# Patient Record
Sex: MALE | Race: WHITE | NOT HISPANIC OR LATINO | Employment: OTHER | URBAN - NONMETROPOLITAN AREA
[De-identification: names, ages, dates, MRNs, and addresses within clinical notes are randomized per-mention and may not be internally consistent; named-entity substitution may affect disease eponyms.]

---

## 2017-06-30 ENCOUNTER — HOSPITAL ENCOUNTER (EMERGENCY)
Facility: HOSPITAL | Age: 62
Discharge: HOME | End: 2017-07-01
Attending: EMERGENCY MEDICINE | Admitting: EMERGENCY MEDICINE

## 2017-06-30 VITALS
BODY MASS INDEX: 22.9 KG/M2 | HEART RATE: 74 BPM | DIASTOLIC BLOOD PRESSURE: 83 MMHG | WEIGHT: 160 LBS | OXYGEN SATURATION: 96 % | HEIGHT: 70 IN | RESPIRATION RATE: 18 BRPM | SYSTOLIC BLOOD PRESSURE: 151 MMHG | TEMPERATURE: 98.7 F

## 2017-06-30 DIAGNOSIS — B95.62 MRSA CELLULITIS: ICD-10-CM

## 2017-06-30 DIAGNOSIS — L03.119 CELLULITIS AND ABSCESS OF LEG: Primary | ICD-10-CM

## 2017-06-30 DIAGNOSIS — L03.90 MRSA CELLULITIS: ICD-10-CM

## 2017-06-30 DIAGNOSIS — L02.419 CELLULITIS AND ABSCESS OF LEG: Primary | ICD-10-CM

## 2017-06-30 PROCEDURE — 99283 EMERGENCY DEPT VISIT LOW MDM: CPT

## 2017-06-30 RX ORDER — SULFAMETHOXAZOLE AND TRIMETHOPRIM 800; 160 MG/1; MG/1
1 TABLET ORAL 2 TIMES DAILY
Qty: 20 TABLET | Refills: 0 | Status: SHIPPED | OUTPATIENT
Start: 2017-06-30 | End: 2018-01-09

## 2017-06-30 RX ORDER — SULFAMETHOXAZOLE AND TRIMETHOPRIM 800; 160 MG/1; MG/1
1 TABLET ORAL ONCE
Status: COMPLETED | OUTPATIENT
Start: 2017-06-30 | End: 2017-07-01

## 2017-07-01 RX ADMIN — SULFAMETHOXAZOLE AND TRIMETHOPRIM 160 MG: 800; 160 TABLET ORAL at 00:04

## 2017-07-01 NOTE — ED PROVIDER NOTES
Subjective   Patient is a 62 y.o. male presenting with lower extremity pain.   History provided by:  Patient  Lower Extremity Issue   Location:  Leg  Time since incident:  2 days  Injury: no    Leg location:  L leg  Pain details:     Quality:  Aching    Radiates to:  Does not radiate    Severity:  Mild    Onset quality:  Gradual    Duration:  2 days    Timing:  Constant    Progression:  Worsening  Chronicity:  New  Dislocation: no    Foreign body present:  No foreign bodies  Prior injury to area:  No  Relieved by:  Nothing  Worsened by:  Bearing weight  Associated symptoms: swelling    Associated symptoms: no back pain, no decreased ROM, no fatigue and no fever        Review of Systems   Constitutional: Negative for chills, diaphoresis, fatigue and fever.   HENT: Negative for rhinorrhea and sore throat.    Eyes: Negative for pain, discharge and visual disturbance.   Respiratory: Negative for cough, chest tightness, shortness of breath and wheezing.    Cardiovascular: Negative for chest pain and leg swelling.   Gastrointestinal: Negative for abdominal pain, blood in stool, constipation, diarrhea and vomiting.   Endocrine: Negative for polydipsia and polyuria.   Genitourinary: Negative for decreased urine volume, dysuria, flank pain, frequency and hematuria.   Musculoskeletal: Negative for back pain and myalgias.   Skin: Negative for rash.   Neurological: Negative for dizziness, syncope, speech difficulty, weakness, light-headedness and headaches.   Psychiatric/Behavioral: Negative for confusion and decreased concentration.   All other systems reviewed and are negative.      No past medical history on file.    Allergies   Allergen Reactions   • Contrast Dye Hives   • Keflex [Cephalexin] Hives       No past surgical history on file.    No family history on file.    Social History     Social History   • Marital status:      Spouse name: N/A   • Number of children: N/A   • Years of education: N/A     Social  History Main Topics   • Smoking status: Not on file   • Smokeless tobacco: Not on file   • Alcohol use Not on file   • Drug use: Not on file   • Sexual activity: Not on file     Other Topics Concern   • Not on file     Social History Narrative           Objective   Physical Exam   Constitutional: He appears well-developed and well-nourished.  Non-toxic appearance.   HENT:   Head: Normocephalic and atraumatic.   Nose: Nose normal.   Mouth/Throat: Oropharynx is clear and moist.   Eyes: Conjunctivae, EOM and lids are normal.   Neck: Neck supple. No tracheal deviation present.   Cardiovascular: Normal rate, regular rhythm, normal heart sounds and intact distal pulses.    No murmur heard.  Pulmonary/Chest: Effort normal and breath sounds normal. No stridor. No tachypnea. No respiratory distress. He has no wheezes. He has no rales.   Abdominal: Soft. Bowel sounds are normal. He exhibits no distension and no mass. There is no tenderness. There is no rebound and no guarding.   Musculoskeletal: He exhibits no edema.   Neurological: He is alert. No cranial nerve deficit. He exhibits normal muscle tone. Coordination normal.   Skin: Skin is warm and dry. No rash noted. There is erythema. No pallor.   L calf with area erythema 3 cm diameter with purple/black center, no fluctuance, no streks.   Psychiatric: He has a normal mood and affect. His behavior is normal. Judgment and thought content normal.   Nursing note and vitals reviewed.      Procedures         ED Course  ED Course                  MDM    Final diagnoses:   Cellulitis and abscess of leg            Ger Miranda MD  07/28/17 0905       Ger Miranda MD  07/28/17 0916

## 2017-07-01 NOTE — ED NOTES
Pt. Presents to the ED with complaints of a spider bite to the left calf that he noticed on Tuesday but states black Jackson appeared yesterday.       Roxanna Herrera RN  06/30/17 [id number]

## 2018-04-10 ENCOUNTER — OFFICE VISIT (OUTPATIENT)
Dept: FAMILY MEDICINE CLINIC | Facility: CLINIC | Age: 63
End: 2018-04-10

## 2018-04-10 VITALS
DIASTOLIC BLOOD PRESSURE: 80 MMHG | SYSTOLIC BLOOD PRESSURE: 120 MMHG | BODY MASS INDEX: 21.47 KG/M2 | HEART RATE: 70 BPM | WEIGHT: 150 LBS | OXYGEN SATURATION: 99 % | HEIGHT: 70 IN

## 2018-04-10 DIAGNOSIS — M54.50 CHRONIC LOW BACK PAIN WITHOUT SCIATICA, UNSPECIFIED BACK PAIN LATERALITY: ICD-10-CM

## 2018-04-10 DIAGNOSIS — G89.29 CHRONIC LOW BACK PAIN WITHOUT SCIATICA, UNSPECIFIED BACK PAIN LATERALITY: ICD-10-CM

## 2018-04-10 DIAGNOSIS — Z23 NEED FOR TETANUS BOOSTER: ICD-10-CM

## 2018-04-10 DIAGNOSIS — Z23 NEED FOR VACCINATION FOR STREP PNEUMONIAE: ICD-10-CM

## 2018-04-10 DIAGNOSIS — F17.200 TOBACCO DEPENDENCE SYNDROME: ICD-10-CM

## 2018-04-10 DIAGNOSIS — Z12.11 COLON CANCER SCREENING: Primary | ICD-10-CM

## 2018-04-10 PROCEDURE — 90472 IMMUNIZATION ADMIN EACH ADD: CPT | Performed by: FAMILY MEDICINE

## 2018-04-10 PROCEDURE — 90732 PPSV23 VACC 2 YRS+ SUBQ/IM: CPT | Performed by: FAMILY MEDICINE

## 2018-04-10 PROCEDURE — 99406 BEHAV CHNG SMOKING 3-10 MIN: CPT | Performed by: FAMILY MEDICINE

## 2018-04-10 PROCEDURE — 90715 TDAP VACCINE 7 YRS/> IM: CPT | Performed by: FAMILY MEDICINE

## 2018-04-10 PROCEDURE — 99203 OFFICE O/P NEW LOW 30 MIN: CPT | Performed by: FAMILY MEDICINE

## 2018-04-10 PROCEDURE — G0009 ADMIN PNEUMOCOCCAL VACCINE: HCPCS | Performed by: FAMILY MEDICINE

## 2018-04-10 RX ORDER — OXYCODONE HYDROCHLORIDE AND ACETAMINOPHEN 5; 325 MG/1; MG/1
1 TABLET ORAL DAILY
COMMUNITY
End: 2021-01-01

## 2018-04-19 PROBLEM — F17.200 TOBACCO DEPENDENCE SYNDROME: Status: ACTIVE | Noted: 2018-04-19

## 2018-04-19 PROBLEM — G89.29 CHRONIC LOWER BACK PAIN: Status: ACTIVE | Noted: 2018-04-19

## 2018-04-19 PROBLEM — M54.50 CHRONIC LOWER BACK PAIN: Status: ACTIVE | Noted: 2018-04-19

## 2018-04-19 NOTE — PROGRESS NOTES
Subjective:     Chief Complaint:   Chief Complaint   Patient presents with   • Establish Care   • Back Pain     chronic , x 3 back surgeries        Leopoldo La is a 62 y.o. male who presents for establish care visit. Patient states that he is coming for preventative services that may be needed. He has a history of prior back surgeries x 3. He states that he sees a pain clinic to get his medications. He states that he has never had a colonoscopy. He states that he is unsure about getting one, but is agreeable to getting a fecal immunoassay for colon cancer screening. He is also due for tetanus and pneumovax today. He states that he has been complaining of fatigue, and in the past, has been getting testosterone injections. He has not been getting these in some time. He states that he does see Dr. Bedolla, and was going to ask him if he can provide this service. Patient is a current smoker. He states that smokes at least a half-pack or less of cigarettes a day. Patient states that at this time, he is not interested in quitting smoking. No other acute issues to discuss today.     Past Medical Hx:  No past medical history on file.    Past Surgical Hx:  No past surgical history on file.    Health Maintenance:  Health Maintenance   Topic Date Due   • HEPATITIS C SCREENING  06/30/2017   • MEDICARE ANNUAL WELLNESS  06/30/2017   • COLONOSCOPY  06/30/2017   • ZOSTER VACCINE  06/30/2017   • INFLUENZA VACCINE  08/01/2018   • TDAP/TD VACCINES (2 - Td) 04/10/2028   • PNEUMOCOCCAL VACCINE (19-64 MEDIUM RISK)  Completed       Current Meds:    Current Outpatient Prescriptions:   •  ibuprofen (ADVIL,MOTRIN) 800 MG tablet, Take 800 mg by mouth Every 8 (Eight) Hours As Needed for Mild Pain ., Disp: , Rfl:   •  methadone (DOLOPHINE) 10 MG tablet, 10 mg 3 (Three) Times a Day , , Disp: , Rfl:   •  methocarbamol (ROBAXIN) 750 MG tablet, Take 750 mg by mouth 3 (Three) Times a Day., Disp: , Rfl:   •  oxyCODONE-acetaminophen (PERCOCET)  "5-325 MG per tablet, Take 1 tablet by mouth Daily., Disp: , Rfl:     Allergies:  Contrast dye and Keflex [cephalexin]    Family Hx:  No family history on file.     Social History:  Social History     Social History   • Marital status:      Spouse name: N/A   • Number of children: N/A   • Years of education: N/A     Occupational History   • Not on file.     Social History Main Topics   • Smoking status: Current Every Day Smoker     Packs/day: 0.25     Years: 20.00     Types: Cigarettes   • Smokeless tobacco: Never Used   • Alcohol use Not on file   • Drug use: Unknown   • Sexual activity: Not on file     Other Topics Concern   • Not on file     Social History Narrative   • No narrative on file       Review of Systems  Review of Systems   Constitutional: Negative for appetite change, chills and fever.   HENT: Negative for congestion, ear pain, rhinorrhea, sneezing and sore throat.    Respiratory: Negative for cough and shortness of breath.    Cardiovascular: Negative for chest pain, palpitations and leg swelling.   Gastrointestinal: Negative for diarrhea, nausea and vomiting.   Endocrine: Negative for polyphagia and polyuria.   Musculoskeletal: Positive for back pain. Negative for neck pain.   Skin: Negative for color change and rash.   Neurological: Negative for dizziness, syncope and weakness.   Psychiatric/Behavioral: Negative for agitation, confusion and dysphoric mood.       Objective:     /80 (BP Location: Left arm, Cuff Size: Adult)   Pulse 70   Ht 177.8 cm (70\")   Wt 68 kg (150 lb)   SpO2 99%   BMI 21.52 kg/m²     Physical Exam   Constitutional: He is oriented to person, place, and time. He appears well-developed and well-nourished.   Cardiovascular: Normal rate, regular rhythm and normal heart sounds.  Exam reveals no gallop and no friction rub.    No murmur heard.  Pulmonary/Chest: Effort normal and breath sounds normal. No respiratory distress. He has no wheezes. He has no rales. "   Abdominal: Soft. Bowel sounds are normal. There is no tenderness.   Musculoskeletal: Normal range of motion. He exhibits no edema.   Neurological: He is alert and oriented to person, place, and time.   Skin: Skin is warm and dry.   Psychiatric: He has a normal mood and affect. His behavior is normal.   Vitals reviewed.         Assessment/Plan:     Leopoldo was seen today for establish care and back pain.    Diagnoses and all orders for this visit:    Colon cancer screening  -     POC FECAL OCCULT BLOOD BY IMMUNOASSAY    Need for tetanus booster  -     Tdap (BOOSTRIX) 5-2.5-18.5 LF-MCG/0.5 injection; Inject 0.5 mL into the shoulder, thigh, or buttocks 1 (One) Time for 1 dose.    Need for vaccination for Strep pneumoniae  -     Pneumococcal Polysaccharide Vaccine 23-Valent Greater Than or Equal To 1yo Subcutaneous / IM    Chronic low back pain without sciatica, unspecified back pain laterality    Tobacco dependence syndrome    Other orders  -     Tdap Vaccine Greater Than or Equal To 6yo IM        Return in about 1 year (around 4/10/2019) for Annual.    GOALS:  Improve fatigue, improve diet and exercise   BARRIERS TO GOALS:  Back pain may limit functionality and ability to exercise.      Preventative:  Male Preventative: Exercises regularly  Cholesterol screening up to date  up to date and documented   Recommended:DTaP, Influenza and Pneumovax  Tobacco: Smoking cessation counseling was provided. A total of 3 minutes was spent on smoking cessation counseling.   Alcohol: does not drink  Diet/Exercise: eat more fruits and vegetables, decrease soda or juice intake, increase water intake, increase physical activity, reduce screen time, reduce portion size, cut out extra servings, reduce fast food intake, family to eat at dinner table more often, keep TV off during meals, plan meals, eat breakfast and have 3 meals a day    RISK SCORE: 3      This document has been electronically signed by Keshawn Aleman MD on April 19,  2018 12:02 PM

## 2021-01-01 ENCOUNTER — INFUSION (OUTPATIENT)
Dept: ONCOLOGY | Facility: HOSPITAL | Age: 66
End: 2021-01-01

## 2021-01-01 ENCOUNTER — APPOINTMENT (OUTPATIENT)
Dept: ONCOLOGY | Facility: HOSPITAL | Age: 66
End: 2021-01-01

## 2021-01-01 ENCOUNTER — APPOINTMENT (OUTPATIENT)
Dept: GENERAL RADIOLOGY | Facility: HOSPITAL | Age: 66
End: 2021-01-01

## 2021-01-01 ENCOUNTER — LAB (OUTPATIENT)
Dept: LAB | Facility: HOSPITAL | Age: 66
End: 2021-01-01

## 2021-01-01 ENCOUNTER — TELEPHONE (OUTPATIENT)
Dept: CARDIAC SURGERY | Facility: CLINIC | Age: 66
End: 2021-01-01

## 2021-01-01 ENCOUNTER — APPOINTMENT (OUTPATIENT)
Dept: ONCOLOGY | Facility: CLINIC | Age: 66
End: 2021-01-01

## 2021-01-01 ENCOUNTER — OFFICE VISIT (OUTPATIENT)
Dept: ONCOLOGY | Facility: CLINIC | Age: 66
End: 2021-01-01

## 2021-01-01 ENCOUNTER — OFFICE VISIT (OUTPATIENT)
Dept: CARDIAC SURGERY | Facility: CLINIC | Age: 66
End: 2021-01-01

## 2021-01-01 ENCOUNTER — NURSE NAVIGATOR (OUTPATIENT)
Dept: ONCOLOGY | Facility: CLINIC | Age: 66
End: 2021-01-01

## 2021-01-01 ENCOUNTER — LAB (OUTPATIENT)
Dept: ONCOLOGY | Facility: HOSPITAL | Age: 66
End: 2021-01-01

## 2021-01-01 ENCOUNTER — HOSPITAL ENCOUNTER (OUTPATIENT)
Dept: PET IMAGING | Facility: HOSPITAL | Age: 66
Discharge: HOME | End: 2021-04-16

## 2021-01-01 ENCOUNTER — TELEPHONE (OUTPATIENT)
Dept: ONCOLOGY | Facility: CLINIC | Age: 66
End: 2021-01-01

## 2021-01-01 ENCOUNTER — APPOINTMENT (OUTPATIENT)
Dept: CT IMAGING | Facility: HOSPITAL | Age: 66
End: 2021-01-01

## 2021-01-01 ENCOUNTER — HOSPITAL ENCOUNTER (OUTPATIENT)
Dept: CT IMAGING | Facility: HOSPITAL | Age: 66
Discharge: HOME | End: 2021-02-10
Admitting: RADIOLOGY

## 2021-01-01 ENCOUNTER — TELEPHONE (OUTPATIENT)
Dept: ONCOLOGY | Facility: HOSPITAL | Age: 66
End: 2021-01-01

## 2021-01-01 ENCOUNTER — HOSPITAL ENCOUNTER (OUTPATIENT)
Dept: MRI IMAGING | Facility: HOSPITAL | Age: 66
Discharge: HOME | End: 2021-04-16

## 2021-01-01 ENCOUNTER — TRANSCRIBE ORDERS (OUTPATIENT)
Dept: LAB | Facility: HOSPITAL | Age: 66
End: 2021-01-01

## 2021-01-01 ENCOUNTER — HOSPITAL ENCOUNTER (OUTPATIENT)
Facility: HOSPITAL | Age: 66
Setting detail: HOSPITAL OUTPATIENT SURGERY
End: 2021-01-01
Attending: SURGERY | Admitting: SURGERY

## 2021-01-01 ENCOUNTER — HOSPITAL ENCOUNTER (OUTPATIENT)
Dept: MRI IMAGING | Facility: HOSPITAL | Age: 66
Discharge: HOME | End: 2021-02-02
Admitting: INTERNAL MEDICINE

## 2021-01-01 ENCOUNTER — TELEPHONE (OUTPATIENT)
Dept: GENERAL RADIOLOGY | Facility: HOSPITAL | Age: 66
End: 2021-01-01

## 2021-01-01 ENCOUNTER — DOCUMENTATION (OUTPATIENT)
Dept: ONCOLOGY | Facility: CLINIC | Age: 66
End: 2021-01-01

## 2021-01-01 ENCOUNTER — PREP FOR SURGERY (OUTPATIENT)
Dept: OTHER | Facility: HOSPITAL | Age: 66
End: 2021-01-01

## 2021-01-01 ENCOUNTER — TRANSCRIBE ORDERS (OUTPATIENT)
Dept: CT IMAGING | Facility: HOSPITAL | Age: 66
End: 2021-01-01

## 2021-01-01 ENCOUNTER — DOCUMENTATION (OUTPATIENT)
Dept: NUTRITION | Facility: HOSPITAL | Age: 66
End: 2021-01-01

## 2021-01-01 ENCOUNTER — TELEPHONE (OUTPATIENT)
Dept: NUTRITION | Facility: HOSPITAL | Age: 66
End: 2021-01-01

## 2021-01-01 ENCOUNTER — TELEPHONE (OUTPATIENT)
Dept: RADIATION ONCOLOGY | Facility: HOSPITAL | Age: 66
End: 2021-01-01

## 2021-01-01 ENCOUNTER — HOSPITAL ENCOUNTER (OUTPATIENT)
Dept: CT IMAGING | Facility: HOSPITAL | Age: 66
Discharge: HOME | End: 2021-01-22

## 2021-01-01 ENCOUNTER — HOSPITAL ENCOUNTER (INPATIENT)
Facility: HOSPITAL | Age: 66
LOS: 1 days | End: 2021-04-25
Attending: EMERGENCY MEDICINE | Admitting: STUDENT IN AN ORGANIZED HEALTH CARE EDUCATION/TRAINING PROGRAM

## 2021-01-01 ENCOUNTER — HOSPITAL ENCOUNTER (EMERGENCY)
Facility: HOSPITAL | Age: 66
Discharge: HOME | End: 2021-02-12
Attending: EMERGENCY MEDICINE | Admitting: EMERGENCY MEDICINE

## 2021-01-01 ENCOUNTER — HOSPITAL ENCOUNTER (OUTPATIENT)
Dept: PET IMAGING | Facility: HOSPITAL | Age: 66
Discharge: HOME | End: 2021-01-29
Admitting: NURSE PRACTITIONER

## 2021-01-01 ENCOUNTER — APPOINTMENT (OUTPATIENT)
Dept: NUCLEAR MEDICINE | Facility: HOSPITAL | Age: 66
End: 2021-01-01

## 2021-01-01 ENCOUNTER — OFFICE VISIT (OUTPATIENT)
Dept: GASTROENTEROLOGY | Facility: CLINIC | Age: 66
End: 2021-01-01

## 2021-01-01 ENCOUNTER — HOSPITAL ENCOUNTER (OUTPATIENT)
Dept: GENERAL RADIOLOGY | Facility: HOSPITAL | Age: 66
Discharge: HOME | End: 2021-03-01

## 2021-01-01 ENCOUNTER — HOSPITAL ENCOUNTER (EMERGENCY)
Facility: HOSPITAL | Age: 66
Discharge: HOME | End: 2021-04-10
Attending: FAMILY MEDICINE | Admitting: FAMILY MEDICINE

## 2021-01-01 ENCOUNTER — CONSULT (OUTPATIENT)
Dept: ONCOLOGY | Facility: CLINIC | Age: 66
End: 2021-01-01

## 2021-01-01 ENCOUNTER — APPOINTMENT (OUTPATIENT)
Dept: MRI IMAGING | Facility: HOSPITAL | Age: 66
End: 2021-01-01

## 2021-01-01 ENCOUNTER — PRE-ADMISSION TESTING (OUTPATIENT)
Dept: PREADMISSION TESTING | Facility: HOSPITAL | Age: 66
End: 2021-01-01

## 2021-01-01 ENCOUNTER — HOSPITAL ENCOUNTER (OUTPATIENT)
Dept: ULTRASOUND IMAGING | Facility: HOSPITAL | Age: 66
Discharge: HOME | End: 2021-03-01

## 2021-01-01 ENCOUNTER — APPOINTMENT (OUTPATIENT)
Dept: PET IMAGING | Facility: HOSPITAL | Age: 66
End: 2021-01-01

## 2021-01-01 ENCOUNTER — HOSPITAL ENCOUNTER (OUTPATIENT)
Dept: INTERVENTIONAL RADIOLOGY/VASCULAR | Facility: HOSPITAL | Age: 66
Discharge: HOME | End: 2021-03-01

## 2021-01-01 ENCOUNTER — TRANSCRIBE ORDERS (OUTPATIENT)
Dept: GENERAL RADIOLOGY | Facility: HOSPITAL | Age: 66
End: 2021-01-01

## 2021-01-01 ENCOUNTER — HOSPITAL ENCOUNTER (OUTPATIENT)
Facility: HOSPITAL | Age: 66
Setting detail: HOSPITAL OUTPATIENT SURGERY
End: 2021-01-01
Attending: INTERNAL MEDICINE | Admitting: INTERNAL MEDICINE

## 2021-01-01 ENCOUNTER — OFFICE VISIT (OUTPATIENT)
Dept: SURGERY | Facility: CLINIC | Age: 66
End: 2021-01-01

## 2021-01-01 ENCOUNTER — NURSE TRIAGE (OUTPATIENT)
Dept: CALL CENTER | Facility: HOSPITAL | Age: 66
End: 2021-01-01

## 2021-01-01 VITALS
DIASTOLIC BLOOD PRESSURE: 64 MMHG | HEIGHT: 70 IN | SYSTOLIC BLOOD PRESSURE: 100 MMHG | BODY MASS INDEX: 19.9 KG/M2 | WEIGHT: 139 LBS | OXYGEN SATURATION: 96 % | HEART RATE: 103 BPM | RESPIRATION RATE: 18 BRPM

## 2021-01-01 VITALS
WEIGHT: 137 LBS | SYSTOLIC BLOOD PRESSURE: 118 MMHG | OXYGEN SATURATION: 98 % | DIASTOLIC BLOOD PRESSURE: 61 MMHG | HEIGHT: 70 IN | RESPIRATION RATE: 20 BRPM | TEMPERATURE: 99 F | BODY MASS INDEX: 19.61 KG/M2 | HEART RATE: 75 BPM

## 2021-01-01 VITALS
WEIGHT: 144 LBS | HEART RATE: 96 BPM | DIASTOLIC BLOOD PRESSURE: 90 MMHG | SYSTOLIC BLOOD PRESSURE: 150 MMHG | HEIGHT: 70 IN | OXYGEN SATURATION: 98 % | TEMPERATURE: 98.1 F | BODY MASS INDEX: 20.62 KG/M2

## 2021-01-01 VITALS
OXYGEN SATURATION: 100 % | TEMPERATURE: 97.6 F | HEIGHT: 70 IN | HEART RATE: 71 BPM | BODY MASS INDEX: 19.47 KG/M2 | DIASTOLIC BLOOD PRESSURE: 78 MMHG | WEIGHT: 136 LBS | RESPIRATION RATE: 18 BRPM | SYSTOLIC BLOOD PRESSURE: 134 MMHG

## 2021-01-01 VITALS
BODY MASS INDEX: 19.9 KG/M2 | TEMPERATURE: 98.7 F | HEIGHT: 70 IN | DIASTOLIC BLOOD PRESSURE: 70 MMHG | HEART RATE: 92 BPM | SYSTOLIC BLOOD PRESSURE: 112 MMHG | WEIGHT: 139 LBS

## 2021-01-01 VITALS
BODY MASS INDEX: 21 KG/M2 | SYSTOLIC BLOOD PRESSURE: 145 MMHG | HEIGHT: 69 IN | DIASTOLIC BLOOD PRESSURE: 93 MMHG | WEIGHT: 141.8 LBS | HEART RATE: 89 BPM

## 2021-01-01 VITALS
WEIGHT: 139.1 LBS | SYSTOLIC BLOOD PRESSURE: 126 MMHG | BODY MASS INDEX: 19.91 KG/M2 | HEIGHT: 70 IN | TEMPERATURE: 98.4 F | DIASTOLIC BLOOD PRESSURE: 61 MMHG | HEART RATE: 92 BPM | RESPIRATION RATE: 18 BRPM

## 2021-01-01 VITALS
RESPIRATION RATE: 16 BRPM | SYSTOLIC BLOOD PRESSURE: 133 MMHG | TEMPERATURE: 97.8 F | DIASTOLIC BLOOD PRESSURE: 75 MMHG | HEART RATE: 83 BPM

## 2021-01-01 VITALS
DIASTOLIC BLOOD PRESSURE: 72 MMHG | SYSTOLIC BLOOD PRESSURE: 132 MMHG | TEMPERATURE: 97.7 F | RESPIRATION RATE: 18 BRPM | HEART RATE: 52 BPM

## 2021-01-01 VITALS
HEIGHT: 70 IN | DIASTOLIC BLOOD PRESSURE: 75 MMHG | TEMPERATURE: 98 F | SYSTOLIC BLOOD PRESSURE: 135 MMHG | HEART RATE: 92 BPM | WEIGHT: 136.8 LBS | BODY MASS INDEX: 19.58 KG/M2 | RESPIRATION RATE: 18 BRPM

## 2021-01-01 VITALS
BODY MASS INDEX: 19.1 KG/M2 | HEART RATE: 108 BPM | RESPIRATION RATE: 18 BRPM | TEMPERATURE: 98.5 F | HEIGHT: 70 IN | DIASTOLIC BLOOD PRESSURE: 60 MMHG | WEIGHT: 133.4 LBS | SYSTOLIC BLOOD PRESSURE: 87 MMHG

## 2021-01-01 VITALS
WEIGHT: 137 LBS | BODY MASS INDEX: 19.61 KG/M2 | TEMPERATURE: 98.5 F | HEIGHT: 70 IN | RESPIRATION RATE: 18 BRPM | DIASTOLIC BLOOD PRESSURE: 75 MMHG | HEART RATE: 80 BPM | SYSTOLIC BLOOD PRESSURE: 131 MMHG

## 2021-01-01 VITALS
SYSTOLIC BLOOD PRESSURE: 141 MMHG | TEMPERATURE: 97 F | HEART RATE: 70 BPM | BODY MASS INDEX: 21.62 KG/M2 | HEIGHT: 69 IN | WEIGHT: 146 LBS | DIASTOLIC BLOOD PRESSURE: 80 MMHG | RESPIRATION RATE: 18 BRPM

## 2021-01-01 VITALS
HEIGHT: 69 IN | HEART RATE: 75 BPM | SYSTOLIC BLOOD PRESSURE: 146 MMHG | BODY MASS INDEX: 21.68 KG/M2 | WEIGHT: 146.4 LBS | DIASTOLIC BLOOD PRESSURE: 91 MMHG

## 2021-01-01 VITALS
HEART RATE: 143 BPM | SYSTOLIC BLOOD PRESSURE: 86 MMHG | DIASTOLIC BLOOD PRESSURE: 53 MMHG | OXYGEN SATURATION: 97 % | RESPIRATION RATE: 57 BRPM

## 2021-01-01 VITALS
WEIGHT: 136.9 LBS | HEART RATE: 97 BPM | OXYGEN SATURATION: 97 % | SYSTOLIC BLOOD PRESSURE: 125 MMHG | BODY MASS INDEX: 19.6 KG/M2 | TEMPERATURE: 98.7 F | DIASTOLIC BLOOD PRESSURE: 74 MMHG | HEIGHT: 70 IN | RESPIRATION RATE: 18 BRPM

## 2021-01-01 VITALS
SYSTOLIC BLOOD PRESSURE: 148 MMHG | TEMPERATURE: 97.6 F | HEART RATE: 102 BPM | RESPIRATION RATE: 18 BRPM | DIASTOLIC BLOOD PRESSURE: 76 MMHG

## 2021-01-01 VITALS
DIASTOLIC BLOOD PRESSURE: 89 MMHG | SYSTOLIC BLOOD PRESSURE: 122 MMHG | RESPIRATION RATE: 18 BRPM | TEMPERATURE: 97.6 F | HEART RATE: 85 BPM

## 2021-01-01 VITALS — WEIGHT: 141 LBS | HEIGHT: 70 IN | BODY MASS INDEX: 20.19 KG/M2

## 2021-01-01 VITALS
RESPIRATION RATE: 18 BRPM | TEMPERATURE: 97.2 F | HEART RATE: 90 BPM | SYSTOLIC BLOOD PRESSURE: 106 MMHG | DIASTOLIC BLOOD PRESSURE: 59 MMHG

## 2021-01-01 VITALS
HEIGHT: 70 IN | DIASTOLIC BLOOD PRESSURE: 80 MMHG | RESPIRATION RATE: 18 BRPM | WEIGHT: 139.99 LBS | BODY MASS INDEX: 20.04 KG/M2 | SYSTOLIC BLOOD PRESSURE: 135 MMHG | TEMPERATURE: 97.8 F | OXYGEN SATURATION: 95 % | HEART RATE: 83 BPM

## 2021-01-01 DIAGNOSIS — K83.1 OBSTRUCTIVE JAUNDICE: Primary | ICD-10-CM

## 2021-01-01 DIAGNOSIS — C34.90 SMALL CELL LUNG CANCER (HCC): ICD-10-CM

## 2021-01-01 DIAGNOSIS — C34.90 MALIGNANT NEOPLASM OF LUNG, UNSPECIFIED LATERALITY, UNSPECIFIED PART OF LUNG (HCC): ICD-10-CM

## 2021-01-01 DIAGNOSIS — G89.3 CANCER ASSOCIATED PAIN: ICD-10-CM

## 2021-01-01 DIAGNOSIS — R91.8 LUNG MASS: ICD-10-CM

## 2021-01-01 DIAGNOSIS — K92.2 UGIB (UPPER GASTROINTESTINAL BLEED): Primary | ICD-10-CM

## 2021-01-01 DIAGNOSIS — C34.90 SMALL CELL LUNG CANCER (HCC): Primary | ICD-10-CM

## 2021-01-01 DIAGNOSIS — R91.1 NODULE OF LEFT LUNG: Primary | ICD-10-CM

## 2021-01-01 DIAGNOSIS — Z51.5 HOSPICE CARE: Primary | ICD-10-CM

## 2021-01-01 DIAGNOSIS — Z45.2 ENCOUNTER FOR VENOUS ACCESS DEVICE CARE: ICD-10-CM

## 2021-01-01 DIAGNOSIS — Z01.818 PRE-OP TESTING: Primary | ICD-10-CM

## 2021-01-01 DIAGNOSIS — K73.9 CHRONIC HEPATITIS, UNSPECIFIED (HCC): ICD-10-CM

## 2021-01-01 DIAGNOSIS — K52.1 CHEMOTHERAPY INDUCED DIARRHEA: ICD-10-CM

## 2021-01-01 DIAGNOSIS — J43.1 PANLOBULAR EMPHYSEMA (HCC): ICD-10-CM

## 2021-01-01 DIAGNOSIS — K83.1 OBSTRUCTIVE JAUNDICE: ICD-10-CM

## 2021-01-01 DIAGNOSIS — K83.8 DILATED BILE DUCT: ICD-10-CM

## 2021-01-01 DIAGNOSIS — C41.9 MALIGNANT NEOPLASM OF BONE AND ARTICULAR CARTILAGE, UNSPECIFIED (HCC): ICD-10-CM

## 2021-01-01 DIAGNOSIS — R10.12 LEFT UPPER QUADRANT ABDOMINAL PAIN: Primary | ICD-10-CM

## 2021-01-01 DIAGNOSIS — T45.1X5A CHEMOTHERAPY INDUCED DIARRHEA: ICD-10-CM

## 2021-01-01 DIAGNOSIS — R11.2 NON-INTRACTABLE VOMITING WITH NAUSEA, UNSPECIFIED VOMITING TYPE: ICD-10-CM

## 2021-01-01 DIAGNOSIS — R57.8 HEMORRHAGIC SHOCK (HCC): ICD-10-CM

## 2021-01-01 DIAGNOSIS — R79.89 ELEVATED LIVER FUNCTION TESTS: ICD-10-CM

## 2021-01-01 DIAGNOSIS — R79.89 ELEVATED LIVER FUNCTION TESTS: Primary | ICD-10-CM

## 2021-01-01 DIAGNOSIS — J96.02 ACUTE RESPIRATORY FAILURE WITH HYPOXIA AND HYPERCAPNIA (HCC): ICD-10-CM

## 2021-01-01 DIAGNOSIS — R91.8 LUNG MASS: Primary | ICD-10-CM

## 2021-01-01 DIAGNOSIS — K80.40 CALCULUS OF BILE DUCT WITH CHOLECYSTITIS WITHOUT OBSTRUCTION, UNSPECIFIED CHOLECYSTITIS ACUITY: ICD-10-CM

## 2021-01-01 DIAGNOSIS — R91.1 NODULE OF LEFT LUNG: ICD-10-CM

## 2021-01-01 DIAGNOSIS — K81.9 CHOLECYSTITIS: ICD-10-CM

## 2021-01-01 DIAGNOSIS — R00.0 TACHYCARDIA: Primary | ICD-10-CM

## 2021-01-01 DIAGNOSIS — R74.8 ABNORMAL SERUM LEVEL OF ALKALINE PHOSPHATASE: Primary | ICD-10-CM

## 2021-01-01 DIAGNOSIS — R07.81 PLEURITIC CHEST PAIN: Primary | ICD-10-CM

## 2021-01-01 DIAGNOSIS — Z79.899 OTHER LONG TERM (CURRENT) DRUG THERAPY: ICD-10-CM

## 2021-01-01 DIAGNOSIS — Z01.818 PREOP TESTING: Primary | ICD-10-CM

## 2021-01-01 DIAGNOSIS — I10 BENIGN ESSENTIAL HTN: ICD-10-CM

## 2021-01-01 DIAGNOSIS — G89.4 CHRONIC PAIN SYNDROME: ICD-10-CM

## 2021-01-01 DIAGNOSIS — R91.1 LUNG NODULE: ICD-10-CM

## 2021-01-01 DIAGNOSIS — R50.9 FEVER, UNSPECIFIED FEVER CAUSE: ICD-10-CM

## 2021-01-01 DIAGNOSIS — Z86.59 CONFUSION, HX OF, WITHOUT NEURO FINDINGS: ICD-10-CM

## 2021-01-01 DIAGNOSIS — K83.1 JAUNDICE, OBSTRUCTIVE, INTRAHEPATIC: Primary | ICD-10-CM

## 2021-01-01 DIAGNOSIS — Z51.5 HOSPICE CARE: ICD-10-CM

## 2021-01-01 DIAGNOSIS — J96.01 ACUTE RESPIRATORY FAILURE WITH HYPOXIA AND HYPERCAPNIA (HCC): ICD-10-CM

## 2021-01-01 DIAGNOSIS — R91.8 MASS OF LEFT LUNG: ICD-10-CM

## 2021-01-01 DIAGNOSIS — K81.1 CHOLECYSTITIS, CHRONIC: ICD-10-CM

## 2021-01-01 DIAGNOSIS — Z01.818 PRE-OP TESTING: ICD-10-CM

## 2021-01-01 DIAGNOSIS — K81.9 CHOLECYSTITIS: Primary | ICD-10-CM

## 2021-01-01 DIAGNOSIS — K83.1 COMMON BILE DUCT OBSTRUCTION: ICD-10-CM

## 2021-01-01 LAB
ABO GROUP BLD: NORMAL
ABO GROUP BLD: NORMAL
ALBUMIN SERPL-MCNC: 1.9 G/DL (ref 3.5–5.2)
ALBUMIN SERPL-MCNC: 2.7 G/DL (ref 3.5–5.2)
ALBUMIN SERPL-MCNC: 3 G/DL (ref 3.5–5.2)
ALBUMIN SERPL-MCNC: 3.3 G/DL (ref 3.5–5.2)
ALBUMIN SERPL-MCNC: 3.6 G/DL (ref 3.5–5.2)
ALBUMIN SERPL-MCNC: 3.7 G/DL (ref 3.5–5.2)
ALBUMIN SERPL-MCNC: 3.7 G/DL (ref 3.5–5.2)
ALBUMIN SERPL-MCNC: 4 G/DL (ref 3.5–5.2)
ALBUMIN SERPL-MCNC: 4.3 G/DL (ref 3.5–5.2)
ALBUMIN/GLOB SERPL: 0.8 G/DL
ALBUMIN/GLOB SERPL: 0.9 G/DL
ALBUMIN/GLOB SERPL: 0.9 G/DL
ALBUMIN/GLOB SERPL: 1 G/DL
ALBUMIN/GLOB SERPL: 1 G/DL
ALBUMIN/GLOB SERPL: 1.1 G/DL
ALBUMIN/GLOB SERPL: 1.1 G/DL
ALBUMIN/GLOB SERPL: 1.3 G/DL
ALP BONE CFR SERPL: 15 % (ref 12–68)
ALP INTEST CFR SERPL: 0 % (ref 0–18)
ALP LIVER CFR SERPL: 85 % (ref 13–88)
ALP SERPL-CCNC: 210 U/L (ref 39–117)
ALP SERPL-CCNC: 239 U/L (ref 39–117)
ALP SERPL-CCNC: 281 U/L (ref 39–117)
ALP SERPL-CCNC: 345 U/L (ref 39–117)
ALP SERPL-CCNC: 376 U/L (ref 39–117)
ALP SERPL-CCNC: 408 U/L (ref 39–117)
ALP SERPL-CCNC: 738 U/L (ref 39–117)
ALP SERPL-CCNC: 756 IU/L (ref 39–117)
ALP SERPL-CCNC: 863 U/L (ref 39–117)
ALP SERPL-CCNC: 880 U/L (ref 39–117)
ALPHA-FETOPROTEIN: 6.63 NG/ML (ref 0–8.3)
ALT SERPL W P-5'-P-CCNC: 157 U/L (ref 1–41)
ALT SERPL W P-5'-P-CCNC: 16 U/L (ref 1–41)
ALT SERPL W P-5'-P-CCNC: 176 U/L (ref 1–41)
ALT SERPL W P-5'-P-CCNC: 186 U/L (ref 1–41)
ALT SERPL W P-5'-P-CCNC: 23 U/L (ref 1–41)
ALT SERPL W P-5'-P-CCNC: 25 U/L (ref 1–41)
ALT SERPL W P-5'-P-CCNC: 26 U/L (ref 1–41)
ALT SERPL W P-5'-P-CCNC: 34 U/L (ref 1–41)
ALT SERPL W P-5'-P-CCNC: 43 U/L (ref 1–41)
ANION GAP SERPL CALCULATED.3IONS-SCNC: 10 MMOL/L (ref 5–15)
ANION GAP SERPL CALCULATED.3IONS-SCNC: 10 MMOL/L (ref 5–15)
ANION GAP SERPL CALCULATED.3IONS-SCNC: 10.8 MMOL/L (ref 5–15)
ANION GAP SERPL CALCULATED.3IONS-SCNC: 11 MMOL/L (ref 5–15)
ANION GAP SERPL CALCULATED.3IONS-SCNC: 12 MMOL/L (ref 5–15)
ANION GAP SERPL CALCULATED.3IONS-SCNC: 7 MMOL/L (ref 5–15)
ANION GAP SERPL CALCULATED.3IONS-SCNC: 9 MMOL/L (ref 5–15)
ANION GAP SERPL CALCULATED.3IONS-SCNC: 9 MMOL/L (ref 5–15)
ANISOCYTOSIS BLD QL: ABNORMAL
APTT PPP: 34.8 SECONDS (ref 20–40.3)
APTT PPP: 36.6 SECONDS (ref 20–40.3)
ARTERIAL PATENCY WRIST A: ABNORMAL
AST SERPL-CCNC: 116 U/L (ref 1–40)
AST SERPL-CCNC: 162 U/L (ref 1–40)
AST SERPL-CCNC: 172 U/L (ref 1–40)
AST SERPL-CCNC: 19 U/L (ref 1–40)
AST SERPL-CCNC: 22 U/L (ref 1–40)
AST SERPL-CCNC: 23 U/L (ref 1–40)
AST SERPL-CCNC: 33 U/L (ref 1–40)
AST SERPL-CCNC: 39 U/L (ref 1–40)
AST SERPL-CCNC: 90 U/L (ref 1–40)
ATMOSPHERIC PRESS: 741 MMHG
BASE EXCESS BLDA CALC-SCNC: -10.2 MMOL/L (ref 0–2)
BASOPHILS # BLD AUTO: 0.03 10*3/MM3 (ref 0–0.2)
BASOPHILS # BLD AUTO: 0.04 10*3/MM3 (ref 0–0.2)
BASOPHILS # BLD AUTO: 0.05 10*3/MM3 (ref 0–0.2)
BASOPHILS # BLD AUTO: 0.06 10*3/MM3 (ref 0–0.2)
BASOPHILS # BLD AUTO: 0.06 10*3/MM3 (ref 0–0.2)
BASOPHILS # BLD AUTO: 0.08 10*3/MM3 (ref 0–0.2)
BASOPHILS # BLD AUTO: 0.09 10*3/MM3 (ref 0–0.2)
BASOPHILS # BLD MANUAL: 0.16 10*3/MM3 (ref 0–0.2)
BASOPHILS NFR BLD AUTO: 0.2 % (ref 0–1.5)
BASOPHILS NFR BLD AUTO: 0.4 % (ref 0–1.5)
BASOPHILS NFR BLD AUTO: 0.4 % (ref 0–1.5)
BASOPHILS NFR BLD AUTO: 0.5 % (ref 0–1.5)
BASOPHILS NFR BLD AUTO: 0.7 % (ref 0–1.5)
BASOPHILS NFR BLD AUTO: 0.8 % (ref 0–1.5)
BASOPHILS NFR BLD AUTO: 0.8 % (ref 0–1.5)
BASOPHILS NFR BLD AUTO: 1 % (ref 0–1.5)
BASOPHILS NFR BLD AUTO: 1 % (ref 0–1.5)
BASOPHILS NFR BLD AUTO: 1.4 % (ref 0–1.5)
BDY SITE: ABNORMAL
BILIRUB CONJ SERPL-MCNC: 1 MG/DL (ref 0–0.3)
BILIRUB INDIRECT SERPL-MCNC: 0.3 MG/DL
BILIRUB SERPL-MCNC: 0.4 MG/DL (ref 0–1.2)
BILIRUB SERPL-MCNC: 0.5 MG/DL (ref 0–1.2)
BILIRUB SERPL-MCNC: 0.7 MG/DL (ref 0–1.2)
BILIRUB SERPL-MCNC: 0.8 MG/DL (ref 0–1.2)
BILIRUB SERPL-MCNC: 1 MG/DL (ref 0–1.2)
BILIRUB SERPL-MCNC: 1.3 MG/DL (ref 0–1.2)
BILIRUB SERPL-MCNC: 2.9 MG/DL (ref 0–1.2)
BILIRUB SERPL-MCNC: 3.3 MG/DL (ref 0–1.2)
BILIRUB SERPL-MCNC: 3.4 MG/DL (ref 0–1.2)
BLD GP AB SCN SERPL QL: NEGATIVE
BUN SERPL-MCNC: 11 MG/DL (ref 8–23)
BUN SERPL-MCNC: 12 MG/DL (ref 8–23)
BUN SERPL-MCNC: 14 MG/DL (ref 8–23)
BUN SERPL-MCNC: 17 MG/DL (ref 8–23)
BUN SERPL-MCNC: 19 MG/DL (ref 8–23)
BUN SERPL-MCNC: 8 MG/DL (ref 8–23)
BUN SERPL-MCNC: 9 MG/DL (ref 8–23)
BUN/CREAT SERPL: 13.1 (ref 7–25)
BUN/CREAT SERPL: 15.5 (ref 7–25)
BUN/CREAT SERPL: 15.5 (ref 7–25)
BUN/CREAT SERPL: 16.5 (ref 7–25)
BUN/CREAT SERPL: 16.7 (ref 7–25)
BUN/CREAT SERPL: 17.9 (ref 7–25)
BUN/CREAT SERPL: 19.7 (ref 7–25)
BUN/CREAT SERPL: 29.7 (ref 7–25)
CALCIUM SPEC-SCNC: 8.5 MG/DL (ref 8.6–10.5)
CALCIUM SPEC-SCNC: 9 MG/DL (ref 8.6–10.5)
CALCIUM SPEC-SCNC: 9.3 MG/DL (ref 8.6–10.5)
CALCIUM SPEC-SCNC: 9.3 MG/DL (ref 8.6–10.5)
CALCIUM SPEC-SCNC: 9.5 MG/DL (ref 8.6–10.5)
CALCIUM SPEC-SCNC: 9.6 MG/DL (ref 8.6–10.5)
CALCIUM SPEC-SCNC: 9.6 MG/DL (ref 8.6–10.5)
CALCIUM SPEC-SCNC: 9.8 MG/DL (ref 8.6–10.5)
CHLORIDE SERPL-SCNC: 100 MMOL/L (ref 98–107)
CHLORIDE SERPL-SCNC: 101 MMOL/L (ref 98–107)
CHLORIDE SERPL-SCNC: 102 MMOL/L (ref 98–107)
CHLORIDE SERPL-SCNC: 95 MMOL/L (ref 98–107)
CHLORIDE SERPL-SCNC: 96 MMOL/L (ref 98–107)
CHLORIDE SERPL-SCNC: 98 MMOL/L (ref 98–107)
CHLORIDE SERPL-SCNC: 99 MMOL/L (ref 98–107)
CHLORIDE SERPL-SCNC: 99 MMOL/L (ref 98–107)
CO2 SERPL-SCNC: 26 MMOL/L (ref 22–29)
CO2 SERPL-SCNC: 27.2 MMOL/L (ref 22–29)
CO2 SERPL-SCNC: 28 MMOL/L (ref 22–29)
CO2 SERPL-SCNC: 28 MMOL/L (ref 22–29)
CO2 SERPL-SCNC: 29 MMOL/L (ref 22–29)
CO2 SERPL-SCNC: 32 MMOL/L (ref 22–29)
CREAT SERPL-MCNC: 0.58 MG/DL (ref 0.76–1.27)
CREAT SERPL-MCNC: 0.61 MG/DL (ref 0.76–1.27)
CREAT SERPL-MCNC: 0.64 MG/DL (ref 0.76–1.27)
CREAT SERPL-MCNC: 0.71 MG/DL (ref 0.76–1.27)
CREAT SERPL-MCNC: 0.71 MG/DL (ref 0.76–1.27)
CREAT SERPL-MCNC: 0.72 MG/DL (ref 0.76–1.27)
CREAT SERPL-MCNC: 0.77 MG/DL (ref 0.76–1.27)
CREAT SERPL-MCNC: 0.78 MG/DL (ref 0.76–1.27)
CREAT SERPL-MCNC: 0.85 MG/DL (ref 0.76–1.27)
D-DIMER, QUANTITATIVE (MAD,POW, STR): 724 NG/ML (FEU) (ref 0–470)
DEPRECATED RDW RBC AUTO: 38.4 FL (ref 37–54)
DEPRECATED RDW RBC AUTO: 40.3 FL (ref 37–54)
DEPRECATED RDW RBC AUTO: 41.1 FL (ref 37–54)
DEPRECATED RDW RBC AUTO: 41.6 FL (ref 37–54)
DEPRECATED RDW RBC AUTO: 42.5 FL (ref 37–54)
DEPRECATED RDW RBC AUTO: 45.3 FL (ref 37–54)
DEPRECATED RDW RBC AUTO: 46 FL (ref 37–54)
DEPRECATED RDW RBC AUTO: 46.8 FL (ref 37–54)
DEPRECATED RDW RBC AUTO: 50.4 FL (ref 37–54)
EOSINOPHIL # BLD AUTO: 0 10*3/MM3 (ref 0–0.4)
EOSINOPHIL # BLD AUTO: 0.02 10*3/MM3 (ref 0–0.4)
EOSINOPHIL # BLD AUTO: 0.03 10*3/MM3 (ref 0–0.4)
EOSINOPHIL # BLD AUTO: 0.06 10*3/MM3 (ref 0–0.4)
EOSINOPHIL # BLD AUTO: 0.23 10*3/MM3 (ref 0–0.4)
EOSINOPHIL # BLD AUTO: 0.26 10*3/MM3 (ref 0–0.4)
EOSINOPHIL # BLD AUTO: 0.29 10*3/MM3 (ref 0–0.4)
EOSINOPHIL # BLD AUTO: 0.39 10*3/MM3 (ref 0–0.4)
EOSINOPHIL # BLD AUTO: 0.44 10*3/MM3 (ref 0–0.4)
EOSINOPHIL NFR BLD AUTO: 0 % (ref 0.3–6.2)
EOSINOPHIL NFR BLD AUTO: 0.2 % (ref 0.3–6.2)
EOSINOPHIL NFR BLD AUTO: 0.2 % (ref 0.3–6.2)
EOSINOPHIL NFR BLD AUTO: 1 % (ref 0.3–6.2)
EOSINOPHIL NFR BLD AUTO: 3 % (ref 0.3–6.2)
EOSINOPHIL NFR BLD AUTO: 4.1 % (ref 0.3–6.2)
EOSINOPHIL NFR BLD AUTO: 4.2 % (ref 0.3–6.2)
EOSINOPHIL NFR BLD AUTO: 4.5 % (ref 0.3–6.2)
EOSINOPHIL NFR BLD AUTO: 4.9 % (ref 0.3–6.2)
ERYTHROCYTE [DISTWIDTH] IN BLOOD BY AUTOMATED COUNT: 12 % (ref 12.3–15.4)
ERYTHROCYTE [DISTWIDTH] IN BLOOD BY AUTOMATED COUNT: 12.7 % (ref 12.3–15.4)
ERYTHROCYTE [DISTWIDTH] IN BLOOD BY AUTOMATED COUNT: 13.1 % (ref 12.3–15.4)
ERYTHROCYTE [DISTWIDTH] IN BLOOD BY AUTOMATED COUNT: 13.2 % (ref 12.3–15.4)
ERYTHROCYTE [DISTWIDTH] IN BLOOD BY AUTOMATED COUNT: 13.6 % (ref 12.3–15.4)
ERYTHROCYTE [DISTWIDTH] IN BLOOD BY AUTOMATED COUNT: 15.6 % (ref 12.3–15.4)
FLUAV RNA RESP QL NAA+PROBE: NOT DETECTED
FLUBV RNA RESP QL NAA+PROBE: NOT DETECTED
GFR SERPL CREATININE-BSD FRML MDRD: 100 ML/MIN/1.73
GFR SERPL CREATININE-BSD FRML MDRD: 102 ML/MIN/1.73
GFR SERPL CREATININE-BSD FRML MDRD: 110 ML/MIN/1.73
GFR SERPL CREATININE-BSD FRML MDRD: 112 ML/MIN/1.73
GFR SERPL CREATININE-BSD FRML MDRD: 112 ML/MIN/1.73
GFR SERPL CREATININE-BSD FRML MDRD: 126 ML/MIN/1.73
GFR SERPL CREATININE-BSD FRML MDRD: 133 ML/MIN/1.73
GFR SERPL CREATININE-BSD FRML MDRD: 141 ML/MIN/1.73
GFR SERPL CREATININE-BSD FRML MDRD: 91 ML/MIN/1.73
GLOBULIN UR ELPH-MCNC: 3.1 GM/DL
GLOBULIN UR ELPH-MCNC: 3.1 GM/DL
GLOBULIN UR ELPH-MCNC: 3.3 GM/DL
GLOBULIN UR ELPH-MCNC: 3.3 GM/DL
GLOBULIN UR ELPH-MCNC: 3.6 GM/DL
GLOBULIN UR ELPH-MCNC: 3.6 GM/DL
GLOBULIN UR ELPH-MCNC: 4 GM/DL
GLOBULIN UR ELPH-MCNC: 4 GM/DL
GLUCOSE BLDC GLUCOMTR-MCNC: 71 MG/DL (ref 70–130)
GLUCOSE SERPL-MCNC: 109 MG/DL (ref 65–99)
GLUCOSE SERPL-MCNC: 110 MG/DL (ref 65–99)
GLUCOSE SERPL-MCNC: 121 MG/DL (ref 65–99)
GLUCOSE SERPL-MCNC: 128 MG/DL (ref 65–99)
GLUCOSE SERPL-MCNC: 134 MG/DL (ref 65–99)
GLUCOSE SERPL-MCNC: 152 MG/DL (ref 65–99)
GLUCOSE SERPL-MCNC: 152 MG/DL (ref 65–99)
GLUCOSE SERPL-MCNC: 99 MG/DL (ref 65–99)
HCO3 BLDA-SCNC: 20.3 MMOL/L (ref 20–26)
HCT VFR BLD AUTO: 28.5 % (ref 37.5–51)
HCT VFR BLD AUTO: 32.3 % (ref 37.5–51)
HCT VFR BLD AUTO: 37 % (ref 37.5–51)
HCT VFR BLD AUTO: 39.4 % (ref 37.5–51)
HCT VFR BLD AUTO: 42.1 % (ref 37.5–51)
HCT VFR BLD AUTO: 42.3 % (ref 37.5–51)
HCT VFR BLD AUTO: 42.4 % (ref 37.5–51)
HCT VFR BLD AUTO: 42.7 % (ref 37.5–51)
HCT VFR BLD AUTO: 45.7 % (ref 37.5–51)
HCV AB SER DONR QL: REACTIVE
HCV RNA SERPL NAA+PROBE-ACNC: NORMAL IU/ML
HGB BLD-MCNC: 11.2 G/DL (ref 13–17.7)
HGB BLD-MCNC: 13.1 G/DL (ref 13–17.7)
HGB BLD-MCNC: 14 G/DL (ref 13–17.7)
HGB BLD-MCNC: 14.2 G/DL (ref 13–17.7)
HGB BLD-MCNC: 14.3 G/DL (ref 13–17.7)
HGB BLD-MCNC: 14.6 G/DL (ref 13–17.7)
HGB BLD-MCNC: 14.8 G/DL (ref 13–17.7)
HGB BLD-MCNC: 16.1 G/DL (ref 13–17.7)
HGB BLD-MCNC: 9.8 G/DL (ref 13–17.7)
HOLD SPECIMEN: NORMAL
IMM GRANULOCYTES # BLD AUTO: 0.03 10*3/MM3 (ref 0–0.05)
IMM GRANULOCYTES # BLD AUTO: 0.04 10*3/MM3 (ref 0–0.05)
IMM GRANULOCYTES # BLD AUTO: 0.04 10*3/MM3 (ref 0–0.05)
IMM GRANULOCYTES # BLD AUTO: 0.05 10*3/MM3 (ref 0–0.05)
IMM GRANULOCYTES # BLD AUTO: 0.09 10*3/MM3 (ref 0–0.05)
IMM GRANULOCYTES # BLD AUTO: 0.12 10*3/MM3 (ref 0–0.05)
IMM GRANULOCYTES # BLD AUTO: 0.12 10*3/MM3 (ref 0–0.05)
IMM GRANULOCYTES # BLD AUTO: 0.13 10*3/MM3 (ref 0–0.05)
IMM GRANULOCYTES # BLD AUTO: 0.45 10*3/MM3 (ref 0–0.05)
IMM GRANULOCYTES NFR BLD AUTO: 0.4 % (ref 0–0.5)
IMM GRANULOCYTES NFR BLD AUTO: 0.5 % (ref 0–0.5)
IMM GRANULOCYTES NFR BLD AUTO: 0.5 % (ref 0–0.5)
IMM GRANULOCYTES NFR BLD AUTO: 0.6 % (ref 0–0.5)
IMM GRANULOCYTES NFR BLD AUTO: 0.9 % (ref 0–0.5)
IMM GRANULOCYTES NFR BLD AUTO: 1.1 % (ref 0–0.5)
IMM GRANULOCYTES NFR BLD AUTO: 1.5 % (ref 0–0.5)
IMM GRANULOCYTES NFR BLD AUTO: 1.9 % (ref 0–0.5)
IMM GRANULOCYTES NFR BLD AUTO: 2.7 % (ref 0–0.5)
INR PPP: 0.92 (ref 0.8–1.2)
INR PPP: 0.97 (ref 0.8–1.2)
LAB AP CASE REPORT: NORMAL
LAB AP CLINICAL INFORMATION: NORMAL
LIPASE SERPL-CCNC: 22 U/L (ref 13–60)
LIPASE SERPL-CCNC: 7 U/L (ref 13–60)
LYMPHOCYTES # BLD AUTO: 0.99 10*3/MM3 (ref 0.7–3.1)
LYMPHOCYTES # BLD AUTO: 1.02 10*3/MM3 (ref 0.7–3.1)
LYMPHOCYTES # BLD AUTO: 1.14 10*3/MM3 (ref 0.7–3.1)
LYMPHOCYTES # BLD AUTO: 1.3 10*3/MM3 (ref 0.7–3.1)
LYMPHOCYTES # BLD AUTO: 1.57 10*3/MM3 (ref 0.7–3.1)
LYMPHOCYTES # BLD AUTO: 1.61 10*3/MM3 (ref 0.7–3.1)
LYMPHOCYTES # BLD AUTO: 2 10*3/MM3 (ref 0.7–3.1)
LYMPHOCYTES # BLD AUTO: 2.12 10*3/MM3 (ref 0.7–3.1)
LYMPHOCYTES # BLD AUTO: 2.52 10*3/MM3 (ref 0.7–3.1)
LYMPHOCYTES # BLD MANUAL: 1.15 10*3/MM3 (ref 0.7–3.1)
LYMPHOCYTES NFR BLD AUTO: 14.5 % (ref 19.6–45.3)
LYMPHOCYTES NFR BLD AUTO: 17 % (ref 19.6–45.3)
LYMPHOCYTES NFR BLD AUTO: 19.8 % (ref 19.6–45.3)
LYMPHOCYTES NFR BLD AUTO: 20.4 % (ref 19.6–45.3)
LYMPHOCYTES NFR BLD AUTO: 24.9 % (ref 19.6–45.3)
LYMPHOCYTES NFR BLD AUTO: 25.7 % (ref 19.6–45.3)
LYMPHOCYTES NFR BLD AUTO: 34.3 % (ref 19.6–45.3)
LYMPHOCYTES NFR BLD AUTO: 6.2 % (ref 19.6–45.3)
LYMPHOCYTES NFR BLD AUTO: 8.1 % (ref 19.6–45.3)
LYMPHOCYTES NFR BLD MANUAL: 5 % (ref 5–12)
LYMPHOCYTES NFR BLD MANUAL: 7 % (ref 19.6–45.3)
Lab: ABNORMAL
Lab: NORMAL
MCH RBC QN AUTO: 29.5 PG (ref 26.6–33)
MCH RBC QN AUTO: 30.3 PG (ref 26.6–33)
MCH RBC QN AUTO: 30.6 PG (ref 26.6–33)
MCH RBC QN AUTO: 31.5 PG (ref 26.6–33)
MCH RBC QN AUTO: 31.8 PG (ref 26.6–33)
MCH RBC QN AUTO: 32.1 PG (ref 26.6–33)
MCH RBC QN AUTO: 32.1 PG (ref 26.6–33)
MCH RBC QN AUTO: 32.3 PG (ref 26.6–33)
MCH RBC QN AUTO: 32.4 PG (ref 26.6–33)
MCHC RBC AUTO-ENTMCNC: 33 G/DL (ref 31.5–35.7)
MCHC RBC AUTO-ENTMCNC: 33.7 G/DL (ref 31.5–35.7)
MCHC RBC AUTO-ENTMCNC: 34.2 G/DL (ref 31.5–35.7)
MCHC RBC AUTO-ENTMCNC: 34.4 G/DL (ref 31.5–35.7)
MCHC RBC AUTO-ENTMCNC: 34.7 G/DL (ref 31.5–35.7)
MCHC RBC AUTO-ENTMCNC: 35 G/DL (ref 31.5–35.7)
MCHC RBC AUTO-ENTMCNC: 35.2 G/DL (ref 31.5–35.7)
MCHC RBC AUTO-ENTMCNC: 35.4 G/DL (ref 31.5–35.7)
MCHC RBC AUTO-ENTMCNC: 36.3 G/DL (ref 31.5–35.7)
MCV RBC AUTO: 88.2 FL (ref 79–97)
MCV RBC AUTO: 88.3 FL (ref 79–97)
MCV RBC AUTO: 88.3 FL (ref 79–97)
MCV RBC AUTO: 88.9 FL (ref 79–97)
MCV RBC AUTO: 89.3 FL (ref 79–97)
MCV RBC AUTO: 90.8 FL (ref 79–97)
MCV RBC AUTO: 91 FL (ref 79–97)
MCV RBC AUTO: 94.7 FL (ref 79–97)
MCV RBC AUTO: 95.9 FL (ref 79–97)
METAMYELOCYTES NFR BLD MANUAL: 2 % (ref 0–0)
MICROCYTES BLD QL: ABNORMAL
MODALITY: ABNORMAL
MONOCYTES # BLD AUTO: 0.22 10*3/MM3 (ref 0.1–0.9)
MONOCYTES # BLD AUTO: 0.64 10*3/MM3 (ref 0.1–0.9)
MONOCYTES # BLD AUTO: 0.7 10*3/MM3 (ref 0.1–0.9)
MONOCYTES # BLD AUTO: 0.71 10*3/MM3 (ref 0.1–0.9)
MONOCYTES # BLD AUTO: 0.82 10*3/MM3 (ref 0.1–0.9)
MONOCYTES # BLD AUTO: 0.86 10*3/MM3 (ref 0.1–0.9)
MONOCYTES # BLD AUTO: 1.05 10*3/MM3 (ref 0.1–0.9)
MONOCYTES # BLD AUTO: 1.08 10*3/MM3 (ref 0.1–0.9)
MONOCYTES # BLD AUTO: 1.48 10*3/MM3 (ref 0.1–0.9)
MONOCYTES # BLD AUTO: 1.83 10*3/MM3 (ref 0.1–0.9)
MONOCYTES NFR BLD AUTO: 10.5 % (ref 5–12)
MONOCYTES NFR BLD AUTO: 17.1 % (ref 5–12)
MONOCYTES NFR BLD AUTO: 18 % (ref 5–12)
MONOCYTES NFR BLD AUTO: 3.5 % (ref 5–12)
MONOCYTES NFR BLD AUTO: 6.6 % (ref 5–12)
MONOCYTES NFR BLD AUTO: 8.8 % (ref 5–12)
MONOCYTES NFR BLD AUTO: 9 % (ref 5–12)
MONOCYTES NFR BLD AUTO: 9.2 % (ref 5–12)
MONOCYTES NFR BLD AUTO: 9.4 % (ref 5–12)
MYELOCYTES NFR BLD MANUAL: 5 % (ref 0–0)
NEUTROPHILS # BLD AUTO: 13.11 10*3/MM3 (ref 1.7–7)
NEUTROPHILS NFR BLD AUTO: 11.23 10*3/MM3 (ref 1.7–7)
NEUTROPHILS NFR BLD AUTO: 13.8 10*3/MM3 (ref 1.7–7)
NEUTROPHILS NFR BLD AUTO: 2.57 10*3/MM3 (ref 1.7–7)
NEUTROPHILS NFR BLD AUTO: 4.05 10*3/MM3 (ref 1.7–7)
NEUTROPHILS NFR BLD AUTO: 4.85 10*3/MM3 (ref 1.7–7)
NEUTROPHILS NFR BLD AUTO: 44.2 % (ref 42.7–76)
NEUTROPHILS NFR BLD AUTO: 5.09 10*3/MM3 (ref 1.7–7)
NEUTROPHILS NFR BLD AUTO: 5.31 10*3/MM3 (ref 1.7–7)
NEUTROPHILS NFR BLD AUTO: 5.86 10*3/MM3 (ref 1.7–7)
NEUTROPHILS NFR BLD AUTO: 59.7 % (ref 42.7–76)
NEUTROPHILS NFR BLD AUTO: 6.58 10*3/MM3 (ref 1.7–7)
NEUTROPHILS NFR BLD AUTO: 61.3 % (ref 42.7–76)
NEUTROPHILS NFR BLD AUTO: 64.2 % (ref 42.7–76)
NEUTROPHILS NFR BLD AUTO: 64.5 % (ref 42.7–76)
NEUTROPHILS NFR BLD AUTO: 69.6 % (ref 42.7–76)
NEUTROPHILS NFR BLD AUTO: 70.9 % (ref 42.7–76)
NEUTROPHILS NFR BLD AUTO: 79.9 % (ref 42.7–76)
NEUTROPHILS NFR BLD AUTO: 84.3 % (ref 42.7–76)
NEUTROPHILS NFR BLD MANUAL: 20 % (ref 42.7–76)
NEUTS BAND NFR BLD MANUAL: 60 % (ref 0–5)
NEUTS VAC BLD QL SMEAR: ABNORMAL
NRBC BLD AUTO-RTO: 0 /100 WBC (ref 0–0.2)
NRBC BLD AUTO-RTO: 0.4 /100 WBC (ref 0–0.2)
NRBC SPEC MANUAL: 2 /100 WBC (ref 0–0.2)
NT-PROBNP SERPL-MCNC: 3487 PG/ML (ref 0–900)
PATH REPORT.ADDENDUM SPEC: NORMAL
PATH REPORT.FINAL DX SPEC: NORMAL
PCO2 BLDA: 65.7 MM HG (ref 35–45)
PH BLDA: 7.1 PH UNITS (ref 7.35–7.45)
PLAT MORPH BLD: NORMAL
PLATELET # BLD AUTO: 112 10*3/MM3 (ref 140–450)
PLATELET # BLD AUTO: 145 10*3/MM3 (ref 140–450)
PLATELET # BLD AUTO: 147 10*3/MM3 (ref 140–450)
PLATELET # BLD AUTO: 170 10*3/MM3 (ref 140–450)
PLATELET # BLD AUTO: 174 10*3/MM3 (ref 140–450)
PLATELET # BLD AUTO: 203 10*3/MM3 (ref 140–450)
PLATELET # BLD AUTO: 240 10*3/MM3 (ref 140–450)
PLATELET # BLD AUTO: 271 10*3/MM3 (ref 140–450)
PLATELET # BLD AUTO: 393 10*3/MM3 (ref 140–450)
PMV BLD AUTO: 10.3 FL (ref 6–12)
PMV BLD AUTO: 10.4 FL (ref 6–12)
PMV BLD AUTO: 10.4 FL (ref 6–12)
PMV BLD AUTO: 10.9 FL (ref 6–12)
PMV BLD AUTO: 11 FL (ref 6–12)
PMV BLD AUTO: 11.1 FL (ref 6–12)
PMV BLD AUTO: 11.3 FL (ref 6–12)
PMV BLD AUTO: 11.4 FL (ref 6–12)
PMV BLD AUTO: 9.3 FL (ref 6–12)
PO2 BLDA: 66.4 MM HG (ref 83–108)
POIKILOCYTOSIS BLD QL SMEAR: ABNORMAL
POLYCHROMASIA BLD QL SMEAR: ABNORMAL
POTASSIUM SERPL-SCNC: 3.3 MMOL/L (ref 3.5–5.2)
POTASSIUM SERPL-SCNC: 3.3 MMOL/L (ref 3.5–5.2)
POTASSIUM SERPL-SCNC: 3.4 MMOL/L (ref 3.5–5.2)
POTASSIUM SERPL-SCNC: 3.5 MMOL/L (ref 3.5–5.2)
POTASSIUM SERPL-SCNC: 3.7 MMOL/L (ref 3.5–5.2)
POTASSIUM SERPL-SCNC: 3.7 MMOL/L (ref 3.5–5.2)
POTASSIUM SERPL-SCNC: 3.8 MMOL/L (ref 3.5–5.2)
POTASSIUM SERPL-SCNC: 4.2 MMOL/L (ref 3.5–5.2)
PROT SERPL-MCNC: 5.2 G/DL (ref 6–8.5)
PROT SERPL-MCNC: 5.8 G/DL (ref 6–8.5)
PROT SERPL-MCNC: 6.4 G/DL (ref 6–8.5)
PROT SERPL-MCNC: 6.6 G/DL (ref 6–8.5)
PROT SERPL-MCNC: 7 G/DL (ref 6–8.5)
PROT SERPL-MCNC: 7.2 G/DL (ref 6–8.5)
PROT SERPL-MCNC: 7.6 G/DL (ref 6–8.5)
PROT SERPL-MCNC: 7.7 G/DL (ref 6–8.5)
PROT SERPL-MCNC: 8 G/DL (ref 6–8.5)
PROTHROMBIN TIME: 12.7 SECONDS (ref 11.1–15.3)
PROTHROMBIN TIME: 13.3 SECONDS (ref 11.1–15.3)
QT INTERVAL: 356 MS
QT INTERVAL: 372 MS
QT INTERVAL: 382 MS
QTC INTERVAL: 404 MS
QTC INTERVAL: 408 MS
QTC INTERVAL: 409 MS
RBC # BLD AUTO: 3.23 10*6/MM3 (ref 4.14–5.8)
RBC # BLD AUTO: 3.66 10*6/MM3 (ref 4.14–5.8)
RBC # BLD AUTO: 4.16 10*6/MM3 (ref 4.14–5.8)
RBC # BLD AUTO: 4.39 10*6/MM3 (ref 4.14–5.8)
RBC # BLD AUTO: 4.46 10*6/MM3 (ref 4.14–5.8)
RBC # BLD AUTO: 4.51 10*6/MM3 (ref 4.14–5.8)
RBC # BLD AUTO: 4.66 10*6/MM3 (ref 4.14–5.8)
RBC # BLD AUTO: 4.75 10*6/MM3 (ref 4.14–5.8)
RBC # BLD AUTO: 5.02 10*6/MM3 (ref 4.14–5.8)
RH BLD: POSITIVE
RH BLD: POSITIVE
SAO2 % BLDCOA: 80.7 % (ref 94–99)
SARS-COV-2 N GENE RESP QL NAA+PROBE: NOT DETECTED
SARS-COV-2 N GENE RESP QL NAA+PROBE: NOT DETECTED
SARS-COV-2 RNA RESP QL NAA+PROBE: NOT DETECTED
SODIUM SERPL-SCNC: 135 MMOL/L (ref 136–145)
SODIUM SERPL-SCNC: 135 MMOL/L (ref 136–145)
SODIUM SERPL-SCNC: 136 MMOL/L (ref 136–145)
SODIUM SERPL-SCNC: 137 MMOL/L (ref 136–145)
SODIUM SERPL-SCNC: 137 MMOL/L (ref 136–145)
SODIUM SERPL-SCNC: 139 MMOL/L (ref 136–145)
T&S EXPIRATION DATE: NORMAL
T3FREE SERPL-MCNC: 3.69 PG/ML (ref 2–4.4)
T4 FREE SERPL-MCNC: 1.15 NG/DL (ref 0.93–1.7)
TEST INFORMATION: NORMAL
TOXIC GRANULATION: ABNORMAL
TROPONIN T SERPL-MCNC: <0.01 NG/ML (ref 0–0.03)
TSH SERPL DL<=0.05 MIU/L-ACNC: 0.51 UIU/ML (ref 0.27–4.2)
TSH SERPL DL<=0.05 MIU/L-ACNC: 2.47 UIU/ML (ref 0.27–4.2)
VENTILATOR MODE: ABNORMAL
WBC # BLD AUTO: 10.72 10*3/MM3 (ref 3.4–10.8)
WBC # BLD AUTO: 14.06 10*3/MM3 (ref 3.4–10.8)
WBC # BLD AUTO: 16.39 10*3/MM3 (ref 3.4–10.8)
WBC # BLD AUTO: 5.83 10*3/MM3 (ref 3.4–10.8)
WBC # BLD AUTO: 6.31 10*3/MM3 (ref 3.4–10.8)
WBC # BLD AUTO: 6.84 10*3/MM3 (ref 3.4–10.8)
WBC # BLD AUTO: 7.63 10*3/MM3 (ref 3.4–10.8)
WBC # BLD AUTO: 7.89 10*3/MM3 (ref 3.4–10.8)
WBC # BLD AUTO: 9.81 10*3/MM3 (ref 3.4–10.8)
WHOLE BLOOD HOLD SPECIMEN: NORMAL
WHOLE BLOOD HOLD SPECIMEN: NORMAL

## 2021-01-01 PROCEDURE — 25010000002 ONDANSETRON PER 1 MG

## 2021-01-01 PROCEDURE — 25010000002 GADOTERIDOL PER 1 ML: Performed by: INTERNAL MEDICINE

## 2021-01-01 PROCEDURE — 25010000002 PALONOSETRON PER 25 MCG: Performed by: INTERNAL MEDICINE

## 2021-01-01 PROCEDURE — 85025 COMPLETE CBC W/AUTO DIFF WBC: CPT | Performed by: EMERGENCY MEDICINE

## 2021-01-01 PROCEDURE — P9016 RBC LEUKOCYTES REDUCED: HCPCS

## 2021-01-01 PROCEDURE — 80076 HEPATIC FUNCTION PANEL: CPT | Performed by: EMERGENCY MEDICINE

## 2021-01-01 PROCEDURE — A9579 GAD-BASE MR CONTRAST NOS,1ML: HCPCS | Performed by: INTERNAL MEDICINE

## 2021-01-01 PROCEDURE — 96413 CHEMO IV INFUSION 1 HR: CPT | Performed by: INTERNAL MEDICINE

## 2021-01-01 PROCEDURE — 25010000002 MIDAZOLAM PER 1 MG: Performed by: RADIOLOGY

## 2021-01-01 PROCEDURE — 99284 EMERGENCY DEPT VISIT MOD MDM: CPT

## 2021-01-01 PROCEDURE — 25010000002 ETOPOSIDE 500 MG/25ML SOLUTION 25 ML VIAL: Performed by: INTERNAL MEDICINE

## 2021-01-01 PROCEDURE — 84484 ASSAY OF TROPONIN QUANT: CPT | Performed by: FAMILY MEDICINE

## 2021-01-01 PROCEDURE — 93010 ELECTROCARDIOGRAM REPORT: CPT | Performed by: INTERNAL MEDICINE

## 2021-01-01 PROCEDURE — G0463 HOSPITAL OUTPT CLINIC VISIT: HCPCS | Performed by: INTERNAL MEDICINE

## 2021-01-01 PROCEDURE — 25010000002 PROCHLORPERAZINE 10 MG/2ML SOLUTION: Performed by: STUDENT IN AN ORGANIZED HEALTH CARE EDUCATION/TRAINING PROGRAM

## 2021-01-01 PROCEDURE — 85007 BL SMEAR W/DIFF WBC COUNT: CPT | Performed by: EMERGENCY MEDICINE

## 2021-01-01 PROCEDURE — 36592 COLLECT BLOOD FROM PICC: CPT | Performed by: INTERNAL MEDICINE

## 2021-01-01 PROCEDURE — 63710000001 PROCHLORPERAZINE MALEATE PER 10 MG: Performed by: INTERNAL MEDICINE

## 2021-01-01 PROCEDURE — 99214 OFFICE O/P EST MOD 30 MIN: CPT | Performed by: INTERNAL MEDICINE

## 2021-01-01 PROCEDURE — 84484 ASSAY OF TROPONIN QUANT: CPT | Performed by: EMERGENCY MEDICINE

## 2021-01-01 PROCEDURE — 84443 ASSAY THYROID STIM HORMONE: CPT

## 2021-01-01 PROCEDURE — 85025 COMPLETE CBC W/AUTO DIFF WBC: CPT

## 2021-01-01 PROCEDURE — 25010000002 LORAZEPAM PER 2 MG: Performed by: NURSE PRACTITIONER

## 2021-01-01 PROCEDURE — C9803 HOPD COVID-19 SPEC COLLECT: HCPCS

## 2021-01-01 PROCEDURE — 78815 PET IMAGE W/CT SKULL-THIGH: CPT

## 2021-01-01 PROCEDURE — 83690 ASSAY OF LIPASE: CPT | Performed by: STUDENT IN AN ORGANIZED HEALTH CARE EDUCATION/TRAINING PROGRAM

## 2021-01-01 PROCEDURE — 0 TECHNETIUM ALBUMIN AGGREGATED: Performed by: EMERGENCY MEDICINE

## 2021-01-01 PROCEDURE — 86850 RBC ANTIBODY SCREEN: CPT | Performed by: THORACIC SURGERY (CARDIOTHORACIC VASCULAR SURGERY)

## 2021-01-01 PROCEDURE — 25010000002 FOSAPREPITANT PER 1 MG: Performed by: INTERNAL MEDICINE

## 2021-01-01 PROCEDURE — 99213 OFFICE O/P EST LOW 20 MIN: CPT | Performed by: INTERNAL MEDICINE

## 2021-01-01 PROCEDURE — 86900 BLOOD TYPING SEROLOGIC ABO: CPT

## 2021-01-01 PROCEDURE — 25010000002 CARBOPLATIN PER 50 MG: Performed by: INTERNAL MEDICINE

## 2021-01-01 PROCEDURE — 85610 PROTHROMBIN TIME: CPT | Performed by: RADIOLOGY

## 2021-01-01 PROCEDURE — 87635 SARS-COV-2 COVID-19 AMP PRB: CPT

## 2021-01-01 PROCEDURE — 84520 ASSAY OF UREA NITROGEN: CPT

## 2021-01-01 PROCEDURE — 25010000002 MORPHINE PER 10 MG: Performed by: NURSE PRACTITIONER

## 2021-01-01 PROCEDURE — 86901 BLOOD TYPING SEROLOGIC RH(D): CPT | Performed by: THORACIC SURGERY (CARDIOTHORACIC VASCULAR SURGERY)

## 2021-01-01 PROCEDURE — 36415 COLL VENOUS BLD VENIPUNCTURE: CPT

## 2021-01-01 PROCEDURE — 36589 REMOVAL TUNNELED CV CATH: CPT | Performed by: INTERNAL MEDICINE

## 2021-01-01 PROCEDURE — C1751 CATH, INF, PER/CENT/MIDLINE: HCPCS

## 2021-01-01 PROCEDURE — 88305 TISSUE EXAM BY PATHOLOGIST: CPT

## 2021-01-01 PROCEDURE — 93005 ELECTROCARDIOGRAM TRACING: CPT | Performed by: EMERGENCY MEDICINE

## 2021-01-01 PROCEDURE — 84080 ASSAY ALKALINE PHOSPHATASES: CPT | Performed by: NURSE PRACTITIONER

## 2021-01-01 PROCEDURE — 25010000002 DEXAMETHASONE SODIUM PHOSPHATE 100 MG/10ML SOLUTION: Performed by: INTERNAL MEDICINE

## 2021-01-01 PROCEDURE — 87522 HEPATITIS C REVRS TRNSCRPJ: CPT

## 2021-01-01 PROCEDURE — 84439 ASSAY OF FREE THYROXINE: CPT

## 2021-01-01 PROCEDURE — G0463 HOSPITAL OUTPT CLINIC VISIT: HCPCS | Performed by: NURSE PRACTITIONER

## 2021-01-01 PROCEDURE — 84075 ASSAY ALKALINE PHOSPHATASE: CPT | Performed by: NURSE PRACTITIONER

## 2021-01-01 PROCEDURE — 25010000002 FENTANYL CITRATE (PF) 100 MCG/2ML SOLUTION: Performed by: RADIOLOGY

## 2021-01-01 PROCEDURE — 85610 PROTHROMBIN TIME: CPT | Performed by: EMERGENCY MEDICINE

## 2021-01-01 PROCEDURE — A9552 F18 FDG: HCPCS | Performed by: INTERNAL MEDICINE

## 2021-01-01 PROCEDURE — 96361 HYDRATE IV INFUSION ADD-ON: CPT

## 2021-01-01 PROCEDURE — 85730 THROMBOPLASTIN TIME PARTIAL: CPT | Performed by: EMERGENCY MEDICINE

## 2021-01-01 PROCEDURE — 85025 COMPLETE CBC W/AUTO DIFF WBC: CPT | Performed by: INTERNAL MEDICINE

## 2021-01-01 PROCEDURE — 96367 TX/PROPH/DG ADDL SEQ IV INF: CPT | Performed by: INTERNAL MEDICINE

## 2021-01-01 PROCEDURE — 99204 OFFICE O/P NEW MOD 45 MIN: CPT | Performed by: INTERNAL MEDICINE

## 2021-01-01 PROCEDURE — 99213 OFFICE O/P EST LOW 20 MIN: CPT | Performed by: NURSE PRACTITIONER

## 2021-01-01 PROCEDURE — 80053 COMPREHEN METABOLIC PANEL: CPT | Performed by: FAMILY MEDICINE

## 2021-01-01 PROCEDURE — 84481 FREE ASSAY (FT-3): CPT

## 2021-01-01 PROCEDURE — 80053 COMPREHEN METABOLIC PANEL: CPT

## 2021-01-01 PROCEDURE — 88173 CYTOPATH EVAL FNA REPORT: CPT

## 2021-01-01 PROCEDURE — 99205 OFFICE O/P NEW HI 60 MIN: CPT | Performed by: THORACIC SURGERY (CARDIOTHORACIC VASCULAR SURGERY)

## 2021-01-01 PROCEDURE — 96374 THER/PROPH/DIAG INJ IV PUSH: CPT

## 2021-01-01 PROCEDURE — 93005 ELECTROCARDIOGRAM TRACING: CPT | Performed by: INTERNAL MEDICINE

## 2021-01-01 PROCEDURE — 83690 ASSAY OF LIPASE: CPT

## 2021-01-01 PROCEDURE — 78580 LUNG PERFUSION IMAGING: CPT

## 2021-01-01 PROCEDURE — 70450 CT HEAD/BRAIN W/O DYE: CPT

## 2021-01-01 PROCEDURE — 93005 ELECTROCARDIOGRAM TRACING: CPT

## 2021-01-01 PROCEDURE — 71046 X-RAY EXAM CHEST 2 VIEWS: CPT

## 2021-01-01 PROCEDURE — 25010000002 ATEZOLIZUMAB 1200 MG/20ML SOLUTION 20 ML VIAL: Performed by: INTERNAL MEDICINE

## 2021-01-01 PROCEDURE — 80053 COMPREHEN METABOLIC PANEL: CPT | Performed by: EMERGENCY MEDICINE

## 2021-01-01 PROCEDURE — 87636 SARSCOV2 & INF A&B AMP PRB: CPT | Performed by: EMERGENCY MEDICINE

## 2021-01-01 PROCEDURE — 82962 GLUCOSE BLOOD TEST: CPT

## 2021-01-01 PROCEDURE — 80053 COMPREHEN METABOLIC PANEL: CPT | Performed by: INTERNAL MEDICINE

## 2021-01-01 PROCEDURE — 96375 TX/PRO/DX INJ NEW DRUG ADDON: CPT | Performed by: INTERNAL MEDICINE

## 2021-01-01 PROCEDURE — 85379 FIBRIN DEGRADATION QUANT: CPT | Performed by: EMERGENCY MEDICINE

## 2021-01-01 PROCEDURE — 88333 PATH CONSLTJ SURG CYTO XM 1: CPT

## 2021-01-01 PROCEDURE — 74183 MRI ABD W/O CNTR FLWD CNTR: CPT

## 2021-01-01 PROCEDURE — 86923 COMPATIBILITY TEST ELECTRIC: CPT

## 2021-01-01 PROCEDURE — 85730 THROMBOPLASTIN TIME PARTIAL: CPT | Performed by: INTERNAL MEDICINE

## 2021-01-01 PROCEDURE — 25010000002 FENTANYL CITRATE (PF) 100 MCG/2ML SOLUTION

## 2021-01-01 PROCEDURE — 86803 HEPATITIS C AB TEST: CPT

## 2021-01-01 PROCEDURE — 82803 BLOOD GASES ANY COMBINATION: CPT

## 2021-01-01 PROCEDURE — 96417 CHEMO IV INFUS EACH ADDL SEQ: CPT | Performed by: INTERNAL MEDICINE

## 2021-01-01 PROCEDURE — 74176 CT ABD & PELVIS W/O CONTRAST: CPT

## 2021-01-01 PROCEDURE — 70553 MRI BRAIN STEM W/O & W/DYE: CPT

## 2021-01-01 PROCEDURE — 96360 HYDRATION IV INFUSION INIT: CPT

## 2021-01-01 PROCEDURE — 36600 WITHDRAWAL OF ARTERIAL BLOOD: CPT

## 2021-01-01 PROCEDURE — 71045 X-RAY EXAM CHEST 1 VIEW: CPT

## 2021-01-01 PROCEDURE — 25010000002 LORAZEPAM PER 2 MG

## 2021-01-01 PROCEDURE — 84443 ASSAY THYROID STIM HORMONE: CPT | Performed by: INTERNAL MEDICINE

## 2021-01-01 PROCEDURE — 36430 TRANSFUSION BLD/BLD COMPNT: CPT

## 2021-01-01 PROCEDURE — 82105 ALPHA-FETOPROTEIN SERUM: CPT

## 2021-01-01 PROCEDURE — 82565 ASSAY OF CREATININE: CPT

## 2021-01-01 PROCEDURE — 25010000002 IOPAMIDOL 61 % SOLUTION: Performed by: NURSE PRACTITIONER

## 2021-01-01 PROCEDURE — 93005 ELECTROCARDIOGRAM TRACING: CPT | Performed by: FAMILY MEDICINE

## 2021-01-01 PROCEDURE — 0 FLUDEOXYGLUCOSE F18 SOLUTION: Performed by: INTERNAL MEDICINE

## 2021-01-01 PROCEDURE — 86900 BLOOD TYPING SEROLOGIC ABO: CPT | Performed by: THORACIC SURGERY (CARDIOTHORACIC VASCULAR SURGERY)

## 2021-01-01 PROCEDURE — 99285 EMERGENCY DEPT VISIT HI MDM: CPT

## 2021-01-01 PROCEDURE — 0 FLUDEOXYGLUCOSE F18 SOLUTION: Performed by: NURSE PRACTITIONER

## 2021-01-01 PROCEDURE — 99204 OFFICE O/P NEW MOD 45 MIN: CPT | Performed by: SURGERY

## 2021-01-01 PROCEDURE — 88172 CYTP DX EVAL FNA 1ST EA SITE: CPT

## 2021-01-01 PROCEDURE — A9540 TC99M MAA: HCPCS | Performed by: EMERGENCY MEDICINE

## 2021-01-01 PROCEDURE — 85025 COMPLETE CBC W/AUTO DIFF WBC: CPT | Performed by: FAMILY MEDICINE

## 2021-01-01 PROCEDURE — 86901 BLOOD TYPING SEROLOGIC RH(D): CPT

## 2021-01-01 PROCEDURE — A9552 F18 FDG: HCPCS | Performed by: NURSE PRACTITIONER

## 2021-01-01 PROCEDURE — 83880 ASSAY OF NATRIURETIC PEPTIDE: CPT | Performed by: FAMILY MEDICINE

## 2021-01-01 PROCEDURE — 71260 CT THORAX DX C+: CPT

## 2021-01-01 RX ORDER — METHADONE HYDROCHLORIDE 5 MG/1
5 TABLET ORAL 2 TIMES DAILY
Qty: 60 TABLET | Refills: 0 | Status: SHIPPED | OUTPATIENT
Start: 2021-01-01 | End: 2021-01-01 | Stop reason: SDUPTHER

## 2021-01-01 RX ORDER — CARISOPRODOL 350 MG/1
350 TABLET ORAL 3 TIMES DAILY
Qty: 90 TABLET | Refills: 3 | Status: SHIPPED | OUTPATIENT
Start: 2021-01-01

## 2021-01-01 RX ORDER — SODIUM CHLORIDE 9 MG/ML
250 INJECTION, SOLUTION INTRAVENOUS ONCE
Status: CANCELLED | OUTPATIENT
Start: 2021-01-01

## 2021-01-01 RX ORDER — SODIUM CHLORIDE 0.9 % (FLUSH) 0.9 %
10 SYRINGE (ML) INJECTION AS NEEDED
Status: DISCONTINUED | OUTPATIENT
Start: 2021-01-01 | End: 2021-01-01 | Stop reason: HOSPADM

## 2021-01-01 RX ORDER — SODIUM CHLORIDE 9 MG/ML
100 INJECTION, SOLUTION INTRAVENOUS CONTINUOUS
Status: CANCELLED | OUTPATIENT
Start: 2021-01-01

## 2021-01-01 RX ORDER — SODIUM CHLORIDE 0.9 % (FLUSH) 0.9 %
10 SYRINGE (ML) INJECTION AS NEEDED
Status: CANCELLED | OUTPATIENT
Start: 2021-01-01

## 2021-01-01 RX ORDER — DIPHENOXYLATE HYDROCHLORIDE AND ATROPINE SULFATE 2.5; .025 MG/1; MG/1
1 TABLET ORAL 4 TIMES DAILY PRN
Qty: 90 TABLET | Refills: 0 | Status: SHIPPED | OUTPATIENT
Start: 2021-01-01 | End: 2021-01-01 | Stop reason: SDUPTHER

## 2021-01-01 RX ORDER — SODIUM CHLORIDE 9 MG/ML
125 INJECTION, SOLUTION INTRAVENOUS CONTINUOUS
Status: DISCONTINUED | OUTPATIENT
Start: 2021-01-01 | End: 2021-01-01 | Stop reason: HOSPADM

## 2021-01-01 RX ORDER — FENTANYL CITRATE 50 UG/ML
INJECTION, SOLUTION INTRAMUSCULAR; INTRAVENOUS
Status: COMPLETED
Start: 2021-01-01 | End: 2021-01-01

## 2021-01-01 RX ORDER — SODIUM CHLORIDE 0.9 % (FLUSH) 0.9 %
20 SYRINGE (ML) INJECTION AS NEEDED
Status: CANCELLED | OUTPATIENT
Start: 2021-01-01

## 2021-01-01 RX ORDER — NALOXONE HCL 0.4 MG/ML
0.4 VIAL (ML) INJECTION
Status: DISCONTINUED | OUTPATIENT
Start: 2021-01-01 | End: 2021-01-01

## 2021-01-01 RX ORDER — LORAZEPAM 2 MG/ML
1 INJECTION INTRAMUSCULAR ONCE
Status: COMPLETED | OUTPATIENT
Start: 2021-01-01 | End: 2021-01-01

## 2021-01-01 RX ORDER — SODIUM CHLORIDE 0.9 % (FLUSH) 0.9 %
20 SYRINGE (ML) INJECTION AS NEEDED
Status: DISCONTINUED | OUTPATIENT
Start: 2021-01-01 | End: 2021-01-01 | Stop reason: HOSPADM

## 2021-01-01 RX ORDER — PALONOSETRON 0.05 MG/ML
0.25 INJECTION, SOLUTION INTRAVENOUS ONCE
Status: COMPLETED | OUTPATIENT
Start: 2021-01-01 | End: 2021-01-01

## 2021-01-01 RX ORDER — PANTOPRAZOLE SODIUM 40 MG/10ML
80 INJECTION, POWDER, LYOPHILIZED, FOR SOLUTION INTRAVENOUS ONCE
Status: COMPLETED | OUTPATIENT
Start: 2021-01-01 | End: 2021-01-01

## 2021-01-01 RX ORDER — ONDANSETRON HYDROCHLORIDE 8 MG/1
8 TABLET, FILM COATED ORAL EVERY 8 HOURS PRN
Qty: 60 TABLET | Refills: 5 | Status: SHIPPED | OUTPATIENT
Start: 2021-01-01 | End: 2021-01-01

## 2021-01-01 RX ORDER — OXYCODONE HYDROCHLORIDE 15 MG/1
15 TABLET ORAL EVERY 4 HOURS PRN
Qty: 84 TABLET | Refills: 0 | Status: SHIPPED | OUTPATIENT
Start: 2021-01-01 | End: 2021-01-01 | Stop reason: SDUPTHER

## 2021-01-01 RX ORDER — SODIUM CHLORIDE 9 MG/ML
250 INJECTION, SOLUTION INTRAVENOUS ONCE
Status: COMPLETED | OUTPATIENT
Start: 2021-01-01 | End: 2021-01-01

## 2021-01-01 RX ORDER — LORAZEPAM 2 MG/ML
1 INJECTION INTRAMUSCULAR EVERY 4 HOURS PRN
Status: DISCONTINUED | OUTPATIENT
Start: 2021-01-01 | End: 2021-01-01 | Stop reason: HOSPADM

## 2021-01-01 RX ORDER — MORPHINE SULFATE 2 MG/ML
2 INJECTION, SOLUTION INTRAMUSCULAR; INTRAVENOUS
Status: DISCONTINUED | OUTPATIENT
Start: 2021-01-01 | End: 2021-01-01

## 2021-01-01 RX ORDER — METHOCARBAMOL 750 MG/1
TABLET, FILM COATED ORAL
Qty: 60 TABLET | Refills: 0 | Status: SHIPPED | OUTPATIENT
Start: 2021-01-01 | End: 2021-01-01

## 2021-01-01 RX ORDER — LEVOFLOXACIN 500 MG/1
500 TABLET, FILM COATED ORAL DAILY
Qty: 10 TABLET | Refills: 0 | Status: SHIPPED | OUTPATIENT
Start: 2021-01-01 | End: 2021-01-01

## 2021-01-01 RX ORDER — DIPHENHYDRAMINE HYDROCHLORIDE 50 MG/ML
50 INJECTION INTRAMUSCULAR; INTRAVENOUS AS NEEDED
Status: DISCONTINUED | OUTPATIENT
Start: 2021-01-01 | End: 2021-01-01 | Stop reason: HOSPADM

## 2021-01-01 RX ORDER — ASPIRIN 81 MG/1
324 TABLET, CHEWABLE ORAL ONCE
Status: DISCONTINUED | OUTPATIENT
Start: 2021-01-01 | End: 2021-01-01 | Stop reason: HOSPADM

## 2021-01-01 RX ORDER — PROCHLORPERAZINE MALEATE 10 MG
10 TABLET ORAL ONCE
Status: COMPLETED | OUTPATIENT
Start: 2021-01-01 | End: 2021-01-01

## 2021-01-01 RX ORDER — ATROPINE SULFATE 10 MG/ML
2 SOLUTION/ DROPS OPHTHALMIC 3 TIMES DAILY
Status: DISCONTINUED | OUTPATIENT
Start: 2021-01-01 | End: 2021-01-01 | Stop reason: HOSPADM

## 2021-01-01 RX ORDER — PROCHLORPERAZINE MALEATE 10 MG
10 TABLET ORAL ONCE
Status: CANCELLED | OUTPATIENT
Start: 2021-01-01

## 2021-01-01 RX ORDER — METHOCARBAMOL 750 MG/1
750 TABLET, FILM COATED ORAL 3 TIMES DAILY
Qty: 60 TABLET | Refills: 0 | Status: SHIPPED | OUTPATIENT
Start: 2021-01-01

## 2021-01-01 RX ORDER — BUTALBITAL, ACETAMINOPHEN AND CAFFEINE 50; 325; 40 MG/1; MG/1; MG/1
1 TABLET ORAL 2 TIMES DAILY PRN
COMMUNITY
End: 2021-01-01 | Stop reason: HOSPADM

## 2021-01-01 RX ORDER — BISACODYL 10 MG
10 SUPPOSITORY, RECTAL RECTAL DAILY PRN
Status: DISCONTINUED | OUTPATIENT
Start: 2021-01-01 | End: 2021-01-01 | Stop reason: HOSPADM

## 2021-01-01 RX ORDER — MORPHINE SULFATE 2 MG/ML
2 INJECTION, SOLUTION INTRAMUSCULAR; INTRAVENOUS EVERY 4 HOURS
Status: DISCONTINUED | OUTPATIENT
Start: 2021-01-01 | End: 2021-01-01 | Stop reason: HOSPADM

## 2021-01-01 RX ORDER — PALONOSETRON 0.05 MG/ML
0.25 INJECTION, SOLUTION INTRAVENOUS ONCE
Status: CANCELLED | OUTPATIENT
Start: 2021-01-01

## 2021-01-01 RX ORDER — FAMOTIDINE 10 MG/ML
20 INJECTION, SOLUTION INTRAVENOUS AS NEEDED
Status: COMPLETED | OUTPATIENT
Start: 2021-01-01 | End: 2021-01-01

## 2021-01-01 RX ORDER — OXYCODONE AND ACETAMINOPHEN 10; 325 MG/1; MG/1
1 TABLET ORAL ONCE
Status: COMPLETED | OUTPATIENT
Start: 2021-01-01 | End: 2021-01-01

## 2021-01-01 RX ORDER — BUTALBITAL, ACETAMINOPHEN AND CAFFEINE 50; 325; 40 MG/1; MG/1; MG/1
1 TABLET ORAL EVERY 4 HOURS PRN
COMMUNITY
End: 2021-01-01

## 2021-01-01 RX ORDER — METHADONE HYDROCHLORIDE 5 MG/1
5 TABLET ORAL 2 TIMES DAILY
Qty: 60 TABLET | Refills: 0 | Status: SHIPPED | OUTPATIENT
Start: 2021-01-01

## 2021-01-01 RX ORDER — PANTOPRAZOLE SODIUM 40 MG/10ML
INJECTION, POWDER, LYOPHILIZED, FOR SOLUTION INTRAVENOUS
Status: COMPLETED
Start: 2021-01-01 | End: 2021-01-01

## 2021-01-01 RX ORDER — ONDANSETRON HYDROCHLORIDE 8 MG/1
TABLET, FILM COATED ORAL
COMMUNITY
Start: 2021-01-01 | End: 2021-01-01 | Stop reason: SDUPTHER

## 2021-01-01 RX ORDER — FAMOTIDINE 10 MG/ML
20 INJECTION, SOLUTION INTRAVENOUS AS NEEDED
Status: CANCELLED | OUTPATIENT
Start: 2021-01-01

## 2021-01-01 RX ORDER — FENTANYL CITRATE 50 UG/ML
50 INJECTION, SOLUTION INTRAMUSCULAR; INTRAVENOUS ONCE
Status: COMPLETED | OUTPATIENT
Start: 2021-01-01 | End: 2021-01-01

## 2021-01-01 RX ORDER — FENTANYL 75 UG/H
1 PATCH TRANSDERMAL
Qty: 10 EACH | Refills: 0 | Status: SHIPPED | OUTPATIENT
Start: 2021-01-01

## 2021-01-01 RX ORDER — DIPHENHYDRAMINE HYDROCHLORIDE 50 MG/ML
50 INJECTION INTRAMUSCULAR; INTRAVENOUS AS NEEDED
Status: CANCELLED | OUTPATIENT
Start: 2021-01-01

## 2021-01-01 RX ORDER — FENTANYL 75 UG/H
1 PATCH TRANSDERMAL
Qty: 10 EACH | Refills: 0 | Status: SHIPPED | OUTPATIENT
Start: 2021-01-01 | End: 2021-01-01 | Stop reason: SDUPTHER

## 2021-01-01 RX ORDER — ALBUTEROL SULFATE 90 UG/1
1 AEROSOL, METERED RESPIRATORY (INHALATION) EVERY 6 HOURS PRN
COMMUNITY
Start: 2021-01-01

## 2021-01-01 RX ORDER — LORAZEPAM 2 MG/ML
INJECTION INTRAMUSCULAR
Status: COMPLETED
Start: 2021-01-01 | End: 2021-01-01

## 2021-01-01 RX ORDER — OXYCODONE HCL 20 MG/ML
CONCENTRATE, ORAL ORAL
Qty: 60 ML | Refills: 0 | Status: SHIPPED | OUTPATIENT
Start: 2021-01-01 | End: 2021-01-01

## 2021-01-01 RX ORDER — FENTANYL 25 UG/H
1 PATCH TRANSDERMAL
Qty: 10 EACH | Refills: 0 | Status: SHIPPED | OUTPATIENT
Start: 2021-01-01 | End: 2021-01-01

## 2021-01-01 RX ORDER — ACETAMINOPHEN 650 MG/1
650 SUPPOSITORY RECTAL EVERY 4 HOURS PRN
Status: DISCONTINUED | OUTPATIENT
Start: 2021-01-01 | End: 2021-01-01 | Stop reason: HOSPADM

## 2021-01-01 RX ORDER — MIDAZOLAM HYDROCHLORIDE 1 MG/ML
INJECTION INTRAMUSCULAR; INTRAVENOUS
Status: COMPLETED | OUTPATIENT
Start: 2021-01-01 | End: 2021-01-01

## 2021-01-01 RX ORDER — FENTANYL CITRATE 50 UG/ML
INJECTION, SOLUTION INTRAMUSCULAR; INTRAVENOUS
Status: DISPENSED
Start: 2021-01-01 | End: 2021-01-01

## 2021-01-01 RX ORDER — CLINDAMYCIN PHOSPHATE 900 MG/50ML
900 INJECTION INTRAVENOUS ONCE
Status: CANCELLED | OUTPATIENT
Start: 2021-01-01 | End: 2021-01-01

## 2021-01-01 RX ORDER — ONDANSETRON 2 MG/ML
4 INJECTION INTRAMUSCULAR; INTRAVENOUS EVERY 6 HOURS PRN
Status: DISCONTINUED | OUTPATIENT
Start: 2021-01-01 | End: 2021-01-01 | Stop reason: HOSPADM

## 2021-01-01 RX ORDER — ONDANSETRON 2 MG/ML
4 INJECTION INTRAMUSCULAR; INTRAVENOUS ONCE
Status: COMPLETED | OUTPATIENT
Start: 2021-01-01 | End: 2021-01-01

## 2021-01-01 RX ORDER — METHADONE HYDROCHLORIDE 5 MG/1
5 TABLET ORAL 2 TIMES DAILY
Qty: 8 TABLET | Refills: 0 | Status: SHIPPED | OUTPATIENT
Start: 2021-01-01 | End: 2021-01-01 | Stop reason: SDUPTHER

## 2021-01-01 RX ORDER — METHOCARBAMOL 750 MG/1
750 TABLET, FILM COATED ORAL 3 TIMES DAILY
Qty: 60 TABLET | Refills: 0 | Status: SHIPPED | OUTPATIENT
Start: 2021-01-01 | End: 2021-01-01

## 2021-01-01 RX ORDER — SODIUM CHLORIDE 0.9 % (FLUSH) 0.9 %
3 SYRINGE (ML) INJECTION EVERY 12 HOURS SCHEDULED
Status: CANCELLED | OUTPATIENT
Start: 2021-01-01

## 2021-01-01 RX ORDER — ONDANSETRON 2 MG/ML
INJECTION INTRAMUSCULAR; INTRAVENOUS
Status: COMPLETED
Start: 2021-01-01 | End: 2021-01-01

## 2021-01-01 RX ORDER — NICOTINE 21 MG/24HR
1 PATCH, TRANSDERMAL 24 HOURS TRANSDERMAL EVERY 24 HOURS
COMMUNITY
End: 2021-01-01 | Stop reason: HOSPADM

## 2021-01-01 RX ORDER — PROCHLORPERAZINE MALEATE 10 MG
10 TABLET ORAL ONCE
Status: CANCELLED | OUTPATIENT
Start: 2021-01-01 | End: 2021-01-01

## 2021-01-01 RX ORDER — FENTANYL CITRATE 50 UG/ML
INJECTION, SOLUTION INTRAMUSCULAR; INTRAVENOUS
Status: COMPLETED | OUTPATIENT
Start: 2021-01-01 | End: 2021-01-01

## 2021-01-01 RX ORDER — SODIUM CHLORIDE 0.9 % (FLUSH) 0.9 %
10 SYRINGE (ML) INJECTION EVERY 12 HOURS SCHEDULED
Status: DISCONTINUED | OUTPATIENT
Start: 2021-01-01 | End: 2021-01-01 | Stop reason: HOSPADM

## 2021-01-01 RX ORDER — OXYCODONE HCL 20 MG/ML
CONCENTRATE, ORAL ORAL
Qty: 30 ML | Refills: 0 | Status: SHIPPED | OUTPATIENT
Start: 2021-01-01 | End: 2021-01-01 | Stop reason: SDUPTHER

## 2021-01-01 RX ORDER — PROCHLORPERAZINE MALEATE 10 MG
10 TABLET ORAL EVERY 6 HOURS PRN
Qty: 80 TABLET | Refills: 3 | Status: SHIPPED | OUTPATIENT
Start: 2021-01-01 | End: 2021-01-01

## 2021-01-01 RX ORDER — PROCHLORPERAZINE MALEATE 5 MG/1
5 TABLET ORAL EVERY 6 HOURS PRN
Qty: 30 TABLET | Refills: 0 | Status: SHIPPED | OUTPATIENT
Start: 2021-01-01

## 2021-01-01 RX ORDER — PROCHLORPERAZINE EDISYLATE 5 MG/ML
5 INJECTION INTRAMUSCULAR; INTRAVENOUS ONCE
Status: COMPLETED | OUTPATIENT
Start: 2021-01-01 | End: 2021-01-01

## 2021-01-01 RX ORDER — DIPHENOXYLATE HYDROCHLORIDE AND ATROPINE SULFATE 2.5; .025 MG/1; MG/1
1 TABLET ORAL 4 TIMES DAILY PRN
Qty: 90 TABLET | Refills: 0 | Status: SHIPPED | OUTPATIENT
Start: 2021-01-01

## 2021-01-01 RX ORDER — SCOLOPAMINE TRANSDERMAL SYSTEM 1 MG/1
1 PATCH, EXTENDED RELEASE TRANSDERMAL
Status: DISCONTINUED | OUTPATIENT
Start: 2021-01-01 | End: 2021-01-01 | Stop reason: HOSPADM

## 2021-01-01 RX ORDER — OXYCODONE HYDROCHLORIDE 15 MG/1
15 TABLET ORAL EVERY 4 HOURS PRN
Qty: 180 TABLET | Refills: 0 | Status: SHIPPED | OUTPATIENT
Start: 2021-01-01 | End: 2021-01-01 | Stop reason: SDUPTHER

## 2021-01-01 RX ORDER — DEXTROSE AND SODIUM CHLORIDE 5; .45 G/100ML; G/100ML
30 INJECTION, SOLUTION INTRAVENOUS CONTINUOUS PRN
Status: CANCELLED | OUTPATIENT
Start: 2021-01-01

## 2021-01-01 RX ORDER — PREGABALIN 100 MG/1
100 CAPSULE ORAL 2 TIMES DAILY
COMMUNITY
Start: 2021-01-01

## 2021-01-01 RX ORDER — ONDANSETRON HYDROCHLORIDE 8 MG/1
8 TABLET, FILM COATED ORAL 3 TIMES DAILY PRN
Qty: 30 TABLET | Refills: 5 | Status: SHIPPED | OUTPATIENT
Start: 2021-01-01

## 2021-01-01 RX ORDER — TAMSULOSIN HYDROCHLORIDE 0.4 MG/1
1 CAPSULE ORAL DAILY
COMMUNITY

## 2021-01-01 RX ORDER — FAMOTIDINE 10 MG/ML
20 INJECTION, SOLUTION INTRAVENOUS AS NEEDED
Status: DISCONTINUED | OUTPATIENT
Start: 2021-01-01 | End: 2021-01-01 | Stop reason: HOSPADM

## 2021-01-01 RX ORDER — CIPROFLOXACIN 500 MG/1
500 TABLET, FILM COATED ORAL 2 TIMES DAILY
Qty: 90 TABLET | Refills: 0 | Status: SHIPPED | OUTPATIENT
Start: 2021-01-01

## 2021-01-01 RX ORDER — OXYCODONE HYDROCHLORIDE 15 MG/1
15 TABLET ORAL EVERY 4 HOURS PRN
Qty: 24 TABLET | Refills: 0 | Status: SHIPPED | OUTPATIENT
Start: 2021-01-01 | End: 2021-01-01

## 2021-01-01 RX ORDER — MORPHINE SULFATE 2 MG/ML
2 INJECTION, SOLUTION INTRAMUSCULAR; INTRAVENOUS
Status: DISCONTINUED | OUTPATIENT
Start: 2021-01-01 | End: 2021-01-01 | Stop reason: HOSPADM

## 2021-01-01 RX ORDER — METHOCARBAMOL 750 MG/1
TABLET, FILM COATED ORAL
Qty: 60 TABLET | Refills: 0 | Status: SHIPPED | OUTPATIENT
Start: 2021-01-01 | End: 2021-01-01 | Stop reason: SDUPTHER

## 2021-01-01 RX ORDER — FENTANYL 75 UG/H
1 PATCH TRANSDERMAL
Status: DISCONTINUED | OUTPATIENT
Start: 2021-01-01 | End: 2021-01-01 | Stop reason: HOSPADM

## 2021-01-01 RX ORDER — MIDAZOLAM HYDROCHLORIDE 1 MG/ML
INJECTION INTRAMUSCULAR; INTRAVENOUS
Status: DISPENSED
Start: 2021-01-01 | End: 2021-01-01

## 2021-01-01 RX ADMIN — SODIUM CHLORIDE, PRESERVATIVE FREE 10 ML: 5 INJECTION INTRAVENOUS at 13:15

## 2021-01-01 RX ADMIN — PALONOSETRON 0.25 MG: 0.05 INJECTION, SOLUTION INTRAVENOUS at 09:09

## 2021-01-01 RX ADMIN — ATEZOLIZUMAB 1200 MG: 1200 INJECTION, SOLUTION INTRAVENOUS at 10:03

## 2021-01-01 RX ADMIN — SODIUM CHLORIDE 2000 ML: 900 INJECTION, SOLUTION INTRAVENOUS at 18:02

## 2021-01-01 RX ADMIN — PROCHLORPERAZINE MALEATE 10 MG: 10 TABLET ORAL at 10:53

## 2021-01-01 RX ADMIN — ETOPOSIDE 180 MG: 20 INJECTION, SOLUTION, CONCENTRATE INTRAVENOUS at 11:04

## 2021-01-01 RX ADMIN — ETOPOSIDE 180 MG: 20 INJECTION, SOLUTION, CONCENTRATE INTRAVENOUS at 12:36

## 2021-01-01 RX ADMIN — ETOPOSIDE 180 MG: 20 INJECTION INTRAVENOUS at 14:32

## 2021-01-01 RX ADMIN — SODIUM CHLORIDE 250 ML: 9 INJECTION, SOLUTION INTRAVENOUS at 09:39

## 2021-01-01 RX ADMIN — FLUDEOXYGLUCOSE F18 1 DOSE: 300 INJECTION INTRAVENOUS at 10:09

## 2021-01-01 RX ADMIN — SODIUM CHLORIDE 250 ML: 9 INJECTION, SOLUTION INTRAVENOUS at 14:26

## 2021-01-01 RX ADMIN — MORPHINE SULFATE 2 MG: 2 INJECTION, SOLUTION INTRAMUSCULAR; INTRAVENOUS at 23:06

## 2021-01-01 RX ADMIN — SODIUM CHLORIDE, PRESERVATIVE FREE 20 ML: 5 INJECTION INTRAVENOUS at 15:46

## 2021-01-01 RX ADMIN — MIDAZOLAM HYDROCHLORIDE 0.5 MG: 2 INJECTION, SOLUTION INTRAMUSCULAR; INTRAVENOUS at 11:21

## 2021-01-01 RX ADMIN — MORPHINE SULFATE 4 MG: 4 INJECTION INTRAVENOUS at 19:10

## 2021-01-01 RX ADMIN — ONDANSETRON HYDROCHLORIDE 4 MG: 2 INJECTION, SOLUTION INTRAMUSCULAR; INTRAVENOUS at 18:36

## 2021-01-01 RX ADMIN — FENTANYL CITRATE 50 MCG: 50 INJECTION, SOLUTION INTRAMUSCULAR; INTRAVENOUS at 18:02

## 2021-01-01 RX ADMIN — SCOLOPAMINE TRANSDERMAL SYSTEM 1 PATCH: 1 PATCH, EXTENDED RELEASE TRANSDERMAL at 21:22

## 2021-01-01 RX ADMIN — DEXAMETHASONE SODIUM PHOSPHATE 12 MG: 10 INJECTION, SOLUTION INTRAMUSCULAR; INTRAVENOUS at 11:18

## 2021-01-01 RX ADMIN — PROCHLORPERAZINE MALEATE 10 MG: 10 TABLET ORAL at 14:26

## 2021-01-01 RX ADMIN — ETOPOSIDE 180 MG: 20 INJECTION INTRAVENOUS at 12:38

## 2021-01-01 RX ADMIN — KIT FOR THE PREPARATION OF TECHNETIUM TC 99M ALBUMIN AGGREGATED 1 DOSE: 2.5 INJECTION, POWDER, FOR SOLUTION INTRAVENOUS at 14:59

## 2021-01-01 RX ADMIN — PALONOSETRON 0.25 MG: 0.05 INJECTION, SOLUTION INTRAVENOUS at 09:31

## 2021-01-01 RX ADMIN — GADOTERIDOL 14 ML: 279.3 INJECTION, SOLUTION INTRAVENOUS at 10:18

## 2021-01-01 RX ADMIN — MORPHINE SULFATE 2 MG: 2 INJECTION, SOLUTION INTRAMUSCULAR; INTRAVENOUS at 22:13

## 2021-01-01 RX ADMIN — OXYCODONE HYDROCHLORIDE AND ACETAMINOPHEN 1 TABLET: 10; 325 TABLET ORAL at 10:57

## 2021-01-01 RX ADMIN — LORAZEPAM 1 MG: 2 INJECTION INTRAMUSCULAR; INTRAVENOUS at 19:11

## 2021-01-01 RX ADMIN — ATROPINE SULFATE 2 DROP: 10 SOLUTION OPHTHALMIC at 21:22

## 2021-01-01 RX ADMIN — PANTOPRAZOLE SODIUM 80 MG: 40 INJECTION, POWDER, LYOPHILIZED, FOR SOLUTION INTRAVENOUS at 18:02

## 2021-01-01 RX ADMIN — SODIUM CHLORIDE, PRESERVATIVE FREE 20 ML: 5 INJECTION INTRAVENOUS at 11:23

## 2021-01-01 RX ADMIN — FAMOTIDINE 20 MG: 10 INJECTION INTRAVENOUS at 12:13

## 2021-01-01 RX ADMIN — OXYCODONE HYDROCHLORIDE AND ACETAMINOPHEN 1 TABLET: 10; 325 TABLET ORAL at 14:53

## 2021-01-01 RX ADMIN — CARBOPLATIN 660 MG: 10 INJECTION, SOLUTION INTRAVENOUS at 11:53

## 2021-01-01 RX ADMIN — ETOPOSIDE 180 MG: 20 INJECTION INTRAVENOUS at 10:04

## 2021-01-01 RX ADMIN — ETOPOSIDE 180 MG: 20 INJECTION INTRAVENOUS at 11:56

## 2021-01-01 RX ADMIN — MORPHINE SULFATE 2 MG: 2 INJECTION, SOLUTION INTRAMUSCULAR; INTRAVENOUS at 20:45

## 2021-01-01 RX ADMIN — FLUDEOXYGLUCOSE F18 1 DOSE: 300 INJECTION INTRAVENOUS at 11:35

## 2021-01-01 RX ADMIN — PROCHLORPERAZINE EDISYLATE 5 MG: 5 INJECTION INTRAMUSCULAR; INTRAVENOUS at 23:46

## 2021-01-01 RX ADMIN — MIDAZOLAM HYDROCHLORIDE 0.5 MG: 2 INJECTION, SOLUTION INTRAMUSCULAR; INTRAVENOUS at 11:33

## 2021-01-01 RX ADMIN — FOSAPREPITANT 100 ML: 150 INJECTION, POWDER, LYOPHILIZED, FOR SOLUTION INTRAVENOUS at 09:50

## 2021-01-01 RX ADMIN — FENTANYL CITRATE 25 MCG: 50 INJECTION, SOLUTION INTRAMUSCULAR; INTRAVENOUS at 11:22

## 2021-01-01 RX ADMIN — PANTOPRAZOLE SODIUM 80 MG: 40 INJECTION, POWDER, FOR SOLUTION INTRAVENOUS at 18:02

## 2021-01-01 RX ADMIN — PANTOPRAZOLE SODIUM 8 MG/HR: 40 INJECTION, POWDER, FOR SOLUTION INTRAVENOUS at 18:02

## 2021-01-01 RX ADMIN — SODIUM CHLORIDE 250 ML: 9 INJECTION, SOLUTION INTRAVENOUS at 10:53

## 2021-01-01 RX ADMIN — ATEZOLIZUMAB 1200 MG: 1200 INJECTION, SOLUTION INTRAVENOUS at 10:32

## 2021-01-01 RX ADMIN — SODIUM CHLORIDE 250 ML: 9 INJECTION, SOLUTION INTRAVENOUS at 09:31

## 2021-01-01 RX ADMIN — LORAZEPAM 1 MG: 2 INJECTION INTRAMUSCULAR at 19:11

## 2021-01-01 RX ADMIN — LORAZEPAM 1 MG: 2 INJECTION INTRAMUSCULAR; INTRAVENOUS at 20:59

## 2021-01-01 RX ADMIN — PROCHLORPERAZINE MALEATE 10 MG: 10 TABLET ORAL at 11:27

## 2021-01-01 RX ADMIN — GADOTERIDOL 15 ML: 279.3 INJECTION, SOLUTION INTRAVENOUS at 11:08

## 2021-01-01 RX ADMIN — SODIUM CHLORIDE, PRESERVATIVE FREE 20 ML: 5 INJECTION INTRAVENOUS at 13:51

## 2021-01-01 RX ADMIN — CARBOPLATIN 590 MG: 10 INJECTION, SOLUTION INTRAVENOUS at 11:51

## 2021-01-01 RX ADMIN — FOSAPREPITANT 100 ML: 150 INJECTION, POWDER, LYOPHILIZED, FOR SOLUTION INTRAVENOUS at 09:19

## 2021-01-01 RX ADMIN — MORPHINE SULFATE 4 MG: 4 INJECTION, SOLUTION INTRAMUSCULAR; INTRAVENOUS at 22:13

## 2021-01-01 RX ADMIN — SODIUM CHLORIDE 1000 ML: 900 INJECTION, SOLUTION INTRAVENOUS at 23:46

## 2021-01-01 RX ADMIN — SODIUM CHLORIDE 125 ML/HR: 900 INJECTION, SOLUTION INTRAVENOUS at 11:05

## 2021-01-01 RX ADMIN — FENTANYL CITRATE 25 MCG: 50 INJECTION, SOLUTION INTRAMUSCULAR; INTRAVENOUS at 11:33

## 2021-01-01 RX ADMIN — ONDANSETRON 4 MG: 2 INJECTION INTRAMUSCULAR; INTRAVENOUS at 18:36

## 2021-01-01 RX ADMIN — PROCHLORPERAZINE MALEATE 10 MG: 10 TABLET ORAL at 09:39

## 2021-01-01 RX ADMIN — IOPAMIDOL 90 ML: 612 INJECTION, SOLUTION INTRAVENOUS at 14:29

## 2021-01-01 RX ADMIN — MORPHINE SULFATE 2 MG: 2 INJECTION, SOLUTION INTRAMUSCULAR; INTRAVENOUS at 22:40

## 2021-01-01 RX ADMIN — SODIUM CHLORIDE 250 ML: 9 INJECTION, SOLUTION INTRAVENOUS at 09:09

## 2021-01-01 RX ADMIN — SODIUM CHLORIDE 250 ML: 9 INJECTION, SOLUTION INTRAVENOUS at 11:27

## 2021-01-26 NOTE — PATIENT INSTRUCTIONS
MyPlate from USDA    MyPlate is an outline of a general healthy diet based on the 2010 Dietary Guidelines for Americans, from the U.[place] Department of Agriculture (USDA). It sets guidelines for how much food you should eat from each food group based on your age, sex, and level of physical activity.  What are tips for following MyPlate?  To follow MyPlate recommendations:  · Eat a wide variety of fruits and vegetables, grains, and protein foods.  · Serve smaller portions and eat less food throughout the day.  · Limit portion sizes to avoid overeating.  · Enjoy your food.  · Get at least 150 minutes of exercise every week. This is about 30 minutes each day, 5 or more days per week.  It can be difficult to have every meal look like MyPlate. Think about MyPlate as eating guidelines for an entire day, rather than each individual meal.  Fruits and vegetables  · Make half of your plate fruits and vegetables.  · Eat many different colors of fruits and vegetables each day.  · For a 2,000 calorie daily food plan, eat:  ? 2½ cups of vegetables every day.  ? 2 cups of fruit every day.  · 1 cup is equal to:  ? 1 cup raw or cooked vegetables.  ? 1 cup raw fruit.  ? 1 medium-sized orange, apple, or banana.  ? 1 cup 100% fruit or vegetable juice.  ? 2 cups raw leafy greens, such as lettuce, spinach, or kale.  ? ½ cup dried fruit.  Grains  · One fourth of your plate should be grains.  · Make at least half of the grains you eat each day whole grains.  · For a 2,000 calorie daily food plan, eat 6 oz of grains every day.  · 1 oz is equal to:  ? 1 slice bread.  ? 1 cup cereal.  ? ½ cup cooked rice, cereal, or pasta.  Protein  · One fourth of your plate should be protein.  · Eat a wide variety of protein foods, including meat, poultry, fish, eggs, beans, nuts, and tofu.  · For a 2,000 calorie daily food plan, eat 5½ oz of protein every day.  · 1 oz is equal to:  ? 1 oz meat, poultry, or fish.  ? ¼ cup cooked beans.  ? 1 egg.  ? ½ oz nuts  or seeds.  ? 1 Tbsp peanut butter.  Dairy  · Drink fat-free or low-fat (1%) milk.  · Eat or drink dairy as a side to meals.  · For a 2,000 calorie daily food plan, eat or drink 3 cups of dairy every day.  · 1 cup is equal to:  ? 1 cup milk, yogurt, cottage cheese, or soy milk (soy beverage).  ? 2 oz processed cheese.  ? 1½ oz natural cheese.  Fats, oils, salt, and sugars  · Only small amounts of oils are recommended.  · Avoid foods that are high in calories and low in nutritional value (empty calories), like foods high in fat or added sugars.  · Choose foods that are low in salt (sodium). Choose foods that have less than 140 milligrams (mg) of sodium per serving.  · Drink water instead of sugary drinks. Drink enough water each day to keep your urine pale yellow.  Where to find support  · Work with your health care provider or a nutrition specialist (dietitian) to develop a customized eating plan that is right for you.  · Download an mason (mobile application) to help you track your daily food intake.  Where to find more information  · Go to ChooseMyPlate.gov for more information.  Summary  · MyPlate is a general guideline for healthy eating from the USDA. It is based on the 2010 Dietary Guidelines for Americans.  · In general, fruits and vegetables should take up ½ of your plate, grains should take up ¼ of your plate, and protein should take up ¼ of your plate.  This information is not intended to replace advice given to you by your health care provider. Make sure you discuss any questions you have with your health care provider.  Document Revised: 05/21/2020 Document Reviewed: 03/19/2018  Elsevier Patient Education © 2020 [employer]  BMI for Adults  What is BMI?  Body mass index (BMI) is a number that is calculated from a person's weight and height. BMI can help estimate how much of a person's weight is composed of fat. BMI does not measure body fat directly. Rather, it is an alternative to procedures that  "directly measure body fat, which can be difficult and expensive.  BMI can help identify people who may be at higher risk for certain medical problems.  What are BMI measurements used for?  BMI is used as a screening tool to identify possible weight problems. It helps determine whether a person is obese, overweight, a healthy weight, or underweight.  BMI is useful for:  · Identifying a weight problem that may be related to a medical condition or may increase the risk for medical problems.  · Promoting changes, such as changes in diet and exercise, to help reach a healthy weight. BMI screening can be repeated to see if these changes are working.  How is BMI calculated?  BMI involves measuring your weight in relation to your height. Both height and weight are measured, and the BMI is calculated from those numbers. This can be done either in English ([place] or metric measurements. Note that charts and online BMI calculators are available to help you find your BMI quickly and easily without having to do these calculations yourself.  To calculate your BMI in English ([place] measurements:    1. Measure your weight in pounds (lb).  2. Multiply the number of pounds by 703.  ? For example, for a person who weighs 180 lb, multiply that number by 703, which equals 126,540.  3. Measure your height in inches. Then multiply that number by itself to get a measurement called \"inches squared.\"  ? For example, for a person who is 70 inches tall, the \"inches squared\" measurement is 70 inches x 70 inches, which equals 4,900 inches squared.  4. Divide the total from step 2 (number of lb x 703) by the total from step 3 (inches squared): 126,540 ÷ 4,900 = 25.8. This is your BMI.  To calculate your BMI in metric measurements:  1. Measure your weight in kilograms (kg).  2. Measure your height in meters (m). Then multiply that number by itself to get a measurement called \"meters squared.\"  ? For example, for a person who is 1.75 m tall, the " "\"meters squared\" measurement is 1.75 m x 1.75 m, which is equal to 3.1 meters squared.  3. Divide the number of kilograms (your weight) by the meters squared number. In this example: 70 ÷ 3.1 = 22.6. This is your BMI.  What do the results mean?  BMI charts are used to identify whether you are underweight, normal weight, overweight, or obese. The following guidelines will be used:  · Underweight: BMI less than 18.5.  · Normal weight: BMI between 18.5 and 24.9.  · Overweight: BMI between 25 and 29.9.  · Obese: BMI of 30 or above.  Keep these notes in mind:  · Weight includes both fat and muscle, so someone with a muscular build, such as an athlete, may have a BMI that is higher than 24.9. In cases like these, BMI is not an accurate measure of body fat.  · To determine if excess body fat is the cause of a BMI of 25 or higher, further assessments may need to be done by a health care provider.  · BMI is usually interpreted in the same way for men and women.  Where to find more information  For more information about BMI, including tools to quickly calculate your BMI, go to these websites:  · Centers for Disease Control and Prevention: www.cdc.gov  · American Heart Association: www.heart.org  · National Heart, Lung, and Blood Harvest: www.nhlbi.nih.gov  Summary  · Body mass index (BMI) is a number that is calculated from a person's weight and height.  · BMI may help estimate how much of a person's weight is composed of fat. BMI can help identify those who may be at higher risk for certain medical problems.  · BMI can be measured using English measurements or metric measurements.  · BMI charts are used to identify whether you are underweight, normal weight, overweight, or obese.  This information is not intended to replace advice given to you by your health care provider. Make sure you discuss any questions you have with your health care provider.  Document Revised: 09/09/2020 Document Reviewed: 07/17/2020  [employer] " Patient Education © 2020 [employer]

## 2021-01-26 NOTE — PROGRESS NOTES
[facility]      Chief Complaint:   Chief Complaint   Patient presents with   • Nodule of left lung       Subjective     HPI:   Patient for evaluation of dilated common bile duct.  Patient has been coughing up blood for the past few months and had a CT scan of the chest which showed a probable cancer within the lung also showed common bile duct dilatation with cholelithiasis.  Patient has noticed jaundice over the past few days and nausea.  Patient denies any abdominal pain at this time.  Patient does have a markedly elevated alkaline phosphatase.    Plan; we will schedule patient for MRCP to evaluate for the possibility of a metastatic lesion versus common bile duct stone.  Also awaiting fractionation of alkaline phosphatase this is markedly elevated.  Patient has an appointment to see pulmonary for evaluation of this lesion in his lung.  Patient will also probably need oncology.  Patient will need his wife with him when he comes for visit says he has difficulty hearing and does get slightly confused.    Past Medical History:   Past Medical History:   Diagnosis Date   • COPD (chronic obstructive pulmonary disease) (CMS/HCC)    • Headache        Past Surgical History:  Past Surgical History:   Procedure Laterality Date   • HERNIA REPAIR     • LUMBAR DISC SURGERY     • LUMBAR FUSION     • TONSILLECTOMY         Family History:  History reviewed. No pertinent family history.    Social History:   reports that he has been smoking cigarettes. He has a 5.00 pack-year smoking history. He has never used smokeless tobacco.    Medications:   Prior to Admission medications    Medication Sig Start Date End Date Taking? Authorizing Provider   albuterol sulfate  (90 Base) MCG/ACT inhaler  1/21/21  Yes Provider, MD Glenda   methadone (DOLOPHINE) 10 MG tablet 10 mg 3 (Three) Times a Day ,  1/5/18  Yes Emergency, Nurse Hiral, RN   methocarbamol (ROBAXIN) 750 MG tablet Take 750 mg by mouth 3 (Three)  "Times a Day.   Yes Emergency, Nurse Hiral, RN   ondansetron (ZOFRAN) 8 MG tablet Take 1 tablet by mouth Every 8 (Eight) Hours As Needed for Nausea or Vomiting. 1/26/21  Yes Corey Trejo MD   oxyCODONE-acetaminophen (PERCOCET) 5-325 MG per tablet Take 1 tablet by mouth Daily.   Yes ProviderGlenda MD   pregabalin (LYRICA) 100 MG capsule 100 mg 2 (Two) Times a Day. 1/18/21  Yes ProviderGlenda MD   tamsulosin (FLOMAX) 0.4 MG capsule 24 hr capsule Take 2 capsules by mouth Daily.   Yes ProviderGlenda MD   ondansetron (ZOFRAN) 8 MG tablet  1/25/21 1/26/21 Yes ProviderGlenda MD   ibuprofen (ADVIL,MOTRIN) 800 MG tablet Take 800 mg by mouth Every 8 (Eight) Hours As Needed for Mild Pain .  1/26/21  Emergency, Nurse Epic, RN       Allergies:  Contrast dye and Keflex [cephalexin]    ROS:    Review of Systems   Constitutional: Negative for activity change, appetite change and unexpected weight change.   HENT: Negative for congestion, sore throat and trouble swallowing.    Respiratory: Negative for cough, choking and shortness of breath.    Cardiovascular: Negative for chest pain.   Gastrointestinal: Negative for abdominal distention, abdominal pain, anal bleeding, blood in stool, constipation, diarrhea, nausea, rectal pain and vomiting.   Endocrine: Negative for heat intolerance, polydipsia and polyphagia.   Genitourinary: Negative for difficulty urinating.   Musculoskeletal: Negative for arthralgias.   Skin: Negative for color change, pallor, rash and wound.   Allergic/Immunologic: Negative for food allergies.   Neurological: Negative for dizziness, syncope, weakness and headaches.   Psychiatric/Behavioral: Negative for agitation, behavioral problems, confusion and decreased concentration.     Objective     Blood pressure 146/91, pulse 75, height 175.3 cm (69\"), weight 66.4 kg (146 lb 6.4 oz).    Physical Exam  Constitutional:       General: He is not in acute distress.     Appearance: He is " well-developed. He is not diaphoretic.   HENT:      Head: Normocephalic and atraumatic.   Cardiovascular:      Rate and Rhythm: Normal rate and regular rhythm.      Heart sounds: Normal heart sounds. No murmur. No friction rub. No gallop.    Pulmonary:      Effort: No respiratory distress.      Breath sounds: Normal breath sounds. No wheezing or rales.   Chest:      Chest wall: No tenderness.   Abdominal:      General: Bowel sounds are normal. There is no distension.      Palpations: Abdomen is soft. There is no mass.      Tenderness: There is no abdominal tenderness. There is no guarding or rebound.      Hernia: No hernia is present.   Musculoskeletal: Normal range of motion.   Skin:     General: Skin is warm and dry.      Coloration: Skin is not pale.      Findings: No erythema or rash.   Neurological:      Mental Status: He is alert and oriented to person, place, and time.   Psychiatric:         Behavior: Behavior normal.         Thought Content: Thought content normal.         Judgment: Judgment normal.          Assessment/Plan   Diagnoses and all orders for this visit:    1. Elevated liver function tests (Primary)  -     MRI Abdomen Without Contrast; Future  -     Hepatitis C Antibody; Future  -     Hepatitis C RNA, Quantitative, PCR (graph); Future  -     AFP Tumor Marker; Future  -     Comprehensive Metabolic Panel; Future  -     Comprehensive Metabolic Panel; Future  -     CBC & Differential; Future    2. Chronic hepatitis, unspecified (CMS/HCC)   -     AFP Tumor Marker; Future    Other orders  -     ondansetron (ZOFRAN) 8 MG tablet; Take 1 tablet by mouth Every 8 (Eight) Hours As Needed for Nausea or Vomiting.  Dispense: 60 tablet; Refill: 5        * Surgery not found *     Diagnosis Plan   1. Elevated liver function tests  MRI Abdomen Without Contrast    Hepatitis C Antibody    Hepatitis C RNA, Quantitative, PCR (graph)    AFP Tumor Marker    Comprehensive Metabolic Panel    Comprehensive Metabolic Panel     CBC & Differential   2. Chronic hepatitis, unspecified (CMS/HCC)   AFP Tumor Marker       Anticipated Surgical Procedure:  Orders Placed This Encounter   Procedures   • MRI Abdomen Without Contrast     Standing Status:   Future     Standing Expiration Date:   1/26/2022   • Hepatitis C Antibody     Standing Status:   Future   • Hepatitis C RNA, Quantitative, PCR (graph)     Standing Status:   Future   • AFP Tumor Marker     Standing Status:   Future   • Comprehensive Metabolic Panel     Standing Status:   Future   • Comprehensive Metabolic Panel     Standing Status:   Future   • CBC & Differential     Standing Status:   Future     Order Specific Question:   Manual Differential     Answer:   No       The risks, benefits, and alternatives of this procedure have been discussed with the patient or the responsible party- the patient understands and agrees to proceed.

## 2021-02-03 PROBLEM — G89.3 CANCER ASSOCIATED PAIN: Status: ACTIVE | Noted: 2021-01-01

## 2021-02-03 PROBLEM — R91.8 LUNG MASS: Status: ACTIVE | Noted: 2021-01-01

## 2021-02-03 PROBLEM — K83.1 OBSTRUCTIVE JAUNDICE: Status: ACTIVE | Noted: 2021-01-01

## 2021-02-03 NOTE — PROGRESS NOTES
"Oncology SW was advised of new patient consult and distress score. Unable to meet face to face during visit.   Oncology SW reached out by phone 2-4-21 in follow up to distress screening and assessment of needs, and coping.  SW spoke with pt's wife by phone.   Introduced self and explained role and support.  Wife states emotional distress as they process feedback from medical oncology as stage IV disease ws discussed.  Wife states he was seen by GI and biopsy for tissue confirmation is planned.    She states question related to a call she received from the \"[facility]\" about a lung test patient must go through before he can obtain PET and possibly other diagnostics.  Discussed with wife the role of the lung navigator and assured she would be reaching out by phone to support with navigation of services.  She clarified that Pt PCP is KALI Garcia at [facility] and procedure for lung test was brought up by her.    Overall, wife describes pt and family experiencing emotions consistent with patient new diagnosis of cancer and adjustment with potential stage IV disease and treatment planning.  Wife describes positive family support, specifically from young adult sons.  SW offered feedback of emotional support, education related to oncology supports and services, contact information provided and plans to meet face to face discussed.    "

## 2021-02-03 NOTE — PROGRESS NOTES
REASON FOR CONSULTATION:  Lung mass,   Provide an opinion on any further workup or treatment                             REQUESTING PHYSICIAN:  Conchis Barkley APRN      RECORDS OBTAINED:  Records of the patients history including those obtained from the referring provider were reviewed and summarized in detail.      History of Present Illness       This is a pleasant 65-year-old male who was seen in consultation at the request of Conchis Barkley APRN addition of lung mass.  Patient has past medical history of chronic low back pain, COPD and tobacco abuse.  He has been experiencing chest pain with hemoptysis for almost over a month now.  He had a CT scan of chest with contrast performed on January 22, 2021 which showed spiculated mass in the left upper lobe measuring 3.2 cm.  There was also bulky left hilar lymphadenopathy with mediastinal extension.  Patient was also noticed to have intrahepatic and extrahepatic biliary duct dilatation.  Subsequently, patient had PET scan performed on January 29, 2021 which showed marked hypermetabolic activity with left hilar, perihilar mediastinal mass with SUV max of 20.4.It also showed multiple osseous metastatic disease in the ribs, right iliac bone, right acetabulum, right proximal femoral shaft.It showed hypermetabolic focus of activity in left lobe of liver as well as right lobe of liver.It showed cholelithiasis with markedly dilated cystic duct measuring 1.3 cm in diameter with a layering of stones in the cystic duct.  Patient has been referred to me for further evaluation and management of his metastatic cancer.    Past Medical History:   Diagnosis Date   • Abnormal PET scan, lung    • COPD (chronic obstructive pulmonary disease) (CMS/HCC)    • Headache    • Lung nodule         Past Surgical History:   Procedure Laterality Date   • HERNIA REPAIR     • LUMBAR DISC SURGERY     • LUMBAR FUSION     • TONSILLECTOMY          Current Outpatient Medications on File  "Prior to Visit   Medication Sig Dispense Refill   • albuterol sulfate  (90 Base) MCG/ACT inhaler      • methadone (DOLOPHINE) 10 MG tablet 10 mg 3 (Three) Times a Day ,      • ondansetron (ZOFRAN) 8 MG tablet Take 1 tablet by mouth Every 8 (Eight) Hours As Needed for Nausea or Vomiting. 60 tablet 5   • pregabalin (LYRICA) 100 MG capsule 100 mg 2 (Two) Times a Day.     • tamsulosin (FLOMAX) 0.4 MG capsule 24 hr capsule Take 2 capsules by mouth Daily.     • [DISCONTINUED] methocarbamol (ROBAXIN) 750 MG tablet Take 750 mg by mouth 3 (Three) Times a Day.     • [DISCONTINUED] oxyCODONE-acetaminophen (PERCOCET) 5-325 MG per tablet Take 1 tablet by mouth Daily.       No current facility-administered medications on file prior to visit.         ALLERGIES:    Allergies   Allergen Reactions   • Contrast Dye Hives   • Keflex [Cephalexin] Hives        Social History     Socioeconomic History   • Marital status:      Spouse name: Not on file   • Number of children: Not on file   • Years of education: Not on file   • Highest education level: Not on file   Tobacco Use   • Smoking status: Former Smoker     Packs/day: 0.25     Years: 20.00     Pack years: 5.00     Types: Cigarettes     Quit date:      Years since quittin.0   • Smokeless tobacco: Never Used        No family history on file.         Objective     Vitals:    21 1110   BP: 141/80   Pulse: 70   Resp: 18   Temp: 97 °F (36.1 °C)   Weight: 66.2 kg (146 lb)   Height: 175.3 cm (69\")   PainSc: 8  Comment: stabbing pain in chest and abd     Current Status 2/3/2021   ECOG score 0       Physical Exam  Vitals signs and nursing note reviewed.   Constitutional:       General: He is not in acute distress.     Appearance: He is not ill-appearing.   HENT:      Mouth/Throat:      Mouth: Mucous membranes are moist.      Pharynx: No oropharyngeal exudate.   Eyes:      General: Scleral icterus present.      Pupils: Pupils are equal, round, and reactive to light. "   Cardiovascular:      Rate and Rhythm: Normal rate and regular rhythm.      Heart sounds: No murmur.   Pulmonary:      Effort: Pulmonary effort is normal. No respiratory distress.      Breath sounds: Normal breath sounds.   Abdominal:      General: There is no distension.      Palpations: Abdomen is soft. There is no mass.      Tenderness: There is no abdominal tenderness.   Musculoskeletal:         General: Tenderness present.      Right lower leg: No edema.      Left lower leg: No edema.   Skin:     Coloration: Skin is jaundiced.   Neurological:      General: No focal deficit present.      Mental Status: He is alert and oriented to person, place, and time. Mental status is at baseline.   Psychiatric:         Mood and Affect: Mood normal.         Behavior: Behavior normal.         Thought Content: Thought content normal.               RECENT LABS:Independently reviewed and summarized  Hematology WBC   Date Value Ref Range Status   01/26/2021 7.63 3.40 - 10.80 10*3/mm3 Final   12/10/2018 7.7 4.0 - 11.0 10*3/uL Final     RBC   Date Value Ref Range Status   01/26/2021 4.66 4.14 - 5.80 10*6/mm3 Final   12/10/2018 4.81 4.73 - 5.49 10*6/uL Final     Hemoglobin   Date Value Ref Range Status   01/26/2021 14.8 13.0 - 17.7 g/dL Final   12/10/2018 15.2 14.4 - 16.6 g/dL Final     Hematocrit   Date Value Ref Range Status   01/26/2021 42.3 37.5 - 51.0 % Final   12/10/2018 46.2 42.9 - 49.1 % Final     Platelets   Date Value Ref Range Status   01/26/2021 203 140 - 450 10*3/mm3 Final   12/10/2018 171 150 - 450 10*3/uL Final        Lab Results   Component Value Date    GLUCOSE 110 (H) 01/26/2021    BUN 12 01/26/2021    CREATININE 0.72 (L) 01/26/2021    EGFRIFNONA 110 01/26/2021    BCR 16.7 01/26/2021    K 3.8 01/26/2021    CO2 27.2 01/26/2021    CALCIUM 9.6 01/26/2021    ALBUMIN 4.30 01/26/2021     (H) 01/26/2021     (H) 01/26/2021       Imaging (independently reviewed and summarized):     Result of CT chest with  contrast from January 22, 2021 reviewed.  This showed 3.2 cm spiculated mass in left upper lobe abutting the pleural surface and measuring 3.2 cm in greatest diameter.    Nuclear medicine PET scan from January 29, 2021 reviewed.  This showed marked hypermetabolic activity associated with left hilar, perihilar, mediastinal mass with SUV max of 20.4.  It also showed multiple osseous metastatic disease in the ribs, right iliac bone, right acetabulum, right proximal femoral shaft.  It showed hypermetabolic focus of activity in left lobe of liver as well as right lobe of liver.  It showed cholelithiasis with markedly dilated cystic duct measuring 1.3 cm in diameter with a layering of stones in the cystic duct.    Leopoldo La reports a pain score of 8.  Given his pain assessment as noted, treatment options were discussed and the following options were decided upon as a follow-up plan to address the patient's pain: prescription for opiod analgesics.    Patient screened positive for depression based on a PHQ-9 score of 0 on 2/3/2021. Follow-up recommendations include: Suicide Risk Assessment performed.      Diagnosis:  (1) Left lung mass  (2) Bone metastases  (3) Obstructive jaundice   (4) cancer associated pain     Assessment/Plan     Result of CT chest, nuclear medicine PET scan as well as MRI abdomen pelvis reviewed.  Patient was going undergoing evaluation for hemoptysis, bony pain as well as obstructive jaundice.  Result of PET scan shared with the patient and family.  This shows concern for primary lung neoplasm with possible bone and liver metastasis.  Interestingly, patient also has obstructive jaundice with elevated alkaline phosphatase concerning for bile duct obstruction.   MRCP showed early metastatic disease in the liver as well as marked intra and extrahepatic biliary dilatation with focal caliber change in the region of proximal common bile duct.  This could represent benign or malignant stricture versus  gallbladder stone.  If this is a malignant stricture whether it is primary biliary tract malignancy versus metastatic disease from lung primary. -   I do believe patient needs ERCP to evaluate and treat biliary obstruction.    Case reviewed with Dr Fonseca - [facility].   Liver lesions and bone lesions are CT occult and not amenable to biopsy.   Peripheral mass in ELIF - smaller than central mass.  Centrally located lung mass - bronchoscopy would be likely higher yield.      I will arrange referral to pulmonary to get bronchoscopy and biopsy arranged.    Patient currently following with pain clinic however his pain has significantly gotten worse especially in back, chest as well as right hip likely from metastatic disease.  He currently takes methadone and Percocet.  I recommend that he continue to follow-up with pain clinic and continue his methadone.  I will discontinue his Percocet and start him on oxycodone 50 mg every 4 hours to be used only for severe pain.  He also use Robaxin which is helped with back pain.  I will send prescription of Robaxin to his pharmacy as well.    I will plan to see him after the results of biopsies are available to determine final treatment recommendation.    I did discuss with patient that likely this is stage IV/metastasized cancer and likely incurable however he would be candidate for palliative type of systemic therapy however I will need to have a tissue diagnosis first.    Recommendations:   - Recommend ERCP for biliary obstruction.   - Recommend bronchoscopy and biopsy.   - Oxycodone 15 mg every 4 hours as needed for severe pain.   - Continue methadone as per pain clinic.   - Prescription of oxycodone and robaxin sent to the pharmacy.

## 2021-02-04 NOTE — TELEPHONE ENCOUNTER
Provider: CHINO  Caller: PATY  Relationship to Patient: WIFE (ON [facility])  Pharmacy: NA  Phone Number: [phone]    Reason for Call: PATIENT TAKES TESTOSTERONE AND THEY HAVE HEARD THAT IT IS NOT GOOD TO TAKE IT WHEN YOU HAVE CANCER, SHOULD HE CONTINUE THE TESTOSTERONE, DR. GALLEGO HIS UROLOGIST PUT HIM ON IT SEVERAL YEARS AGO, PLEASE ADVISE?    When was the patient last seen: 2-3-21  When did it start:   Where is it located:  Characteristics of symptom/severity:   Timing- Is it constant or intermittent:   What makes it worse:   What makes it better:   What therapies/medications have you tried:

## 2021-02-04 NOTE — PROGRESS NOTES
LUNG NAVIGATION ENCOUNTER  ENCOUNTER TYPE: phone    2/3/21 Lung Navigation referral from Dr. Sosa on day of Medical Oncology consult to assist with obtaining appt. for Bronchoscopy w/biopsy and ERCP. Office of Dr. Fuller and Pulmonology contacted same day who will review case and schedule accordingly with Dr. Fuller or Pulmonologist. Pt seen today, 2/4/21 by Javier Trejo and Rajendra; ERCP procedure scheduled for 2/9/21 with Dr. Drew pending insurance approval.    Contacted pt. today by phone to introduce myself as the Lung Navigator at [facility] and explained my role to provide support and education to patient and family as needed. Spoke with pt’s significant other, Lily Diaz while pt was resting.   Per Ms. Diaz, PCP had initiated prior contact with Dr. Avilez at [facility] who requested PET and PFT’s be completed to make recommendation for further diagnostic work-up. PET completed 1/29/21 at [facility]; however, pt was referred to [facility] at [facility] for PFT’s which has not yet been scheduled.  She and pt have many questions regarding patient’s clinical diagnosis and process for obtaining tissue confirmation to guide treatment planning. They are understandably very anxious for diagnostic work-up to be completed so treatment can commence. Feelings were validated and education was provided regarding pending procedures. Assurance was provided that diagnostic work-up would be scheduled and completed as early as possible.   They are requesting PFT’s be scheduled at [facility]. Practice is currently closed for the day; plan to contact in am for PFT scheduling and update on case review by Dr. Fuller for lung biopsy recommendation.  Provided my contact information and encouraged pt to reach out anytime to me with any concerns or questions. Assured pt and Ms Diaz that I would call them tomorrow with additional information regarding upcoming appointments and plan of  care.

## 2021-02-05 NOTE — PROGRESS NOTES
Chief Complaint   Patient presents with   • Hepatitis C   • Weight Loss   • Constipation       Subjective    Leopoldo Mcbridet is a 65 y.o. male.    History of Present Illness  Patient presented to GI clinic for follow-up visit today.  Has continued symptoms of jaundice and weight loss.  Has occasional symptoms with epigastric abdominal pain.  Denied nausea or vomiting.  MRCP was consistent with intra and extrahepatic biliary dilatation with extrinsic compression of the proximal CBD.  LFTs are elevated and obstructive pattern.  PET scan was consistent with pulmonary, hepatic and bone lesions.  Seen by oncologist and is awaiting chemo.       The following portions of the patient's history were reviewed and updated as appropriate:   Past Medical History:   Diagnosis Date   • Abnormal PET scan, lung    • COPD (chronic obstructive pulmonary disease) (CMS/HCC)    • Headache    • Lung nodule      Past Surgical History:   Procedure Laterality Date   • HERNIA REPAIR     • LUMBAR DISC SURGERY     • LUMBAR FUSION     • TONSILLECTOMY       No family history on file.    Prior to Admission medications    Medication Sig Start Date End Date Taking? Authorizing Provider   albuterol sulfate  (90 Base) MCG/ACT inhaler Inhale 1 puff. 1/21/21   ProviderGlenda MD   Aspirin-Salicylamide-Caffeine (BC HEADACHE POWDER PO) Take 1 packet by mouth 2 (Two) Times a Day As Needed.    ProviderGlenda MD   butalbital-acetaminophen-caffeine (FIORICET, ESGIC) -40 MG per tablet Take 1 tablet by mouth 2 (Two) Times a Day As Needed for Headache.    ProviderGlenda MD   levoFLOXacin (Levaquin) 500 MG tablet Take 1 tablet by mouth Daily for 10 days. 2/4/21 2/14/21  Elba Drew MD   methadone (DOLOPHINE) 10 MG tablet Take 5 mg by mouth 3 (Three) Times a Day ,  1/5/18   Emergency, Nurse Epic, RN   methocarbamol (ROBAXIN) 750 MG tablet Take 1 tablet by mouth 3 (Three) Times a Day. 2/3/21   Arianne Mckeon MD  "  nicotine (NICODERM CQ) 21 MG/24HR patch Place 1 patch on the skin as directed by provider Daily.    Glenda Rothman MD   ondansetron (ZOFRAN) 8 MG tablet Take 1 tablet by mouth Every 8 (Eight) Hours As Needed for Nausea or Vomiting. 21   Corey Trejo MD   oxyCODONE (ROXICODONE) 15 MG immediate release tablet Take 1 tablet by mouth Every 4 (Four) Hours As Needed for Moderate Pain  or Severe Pain  for up to 14 days. 2/3/21 2/17/21  Arianne Mckeon MD   pregabalin (LYRICA) 100 MG capsule 100 mg 2 (Two) Times a Day. 21   Glenda Rothman MD   tamsulosin (FLOMAX) 0.4 MG capsule 24 hr capsule Take 1 capsule by mouth Daily.    Glenda Rothman MD     Allergies   Allergen Reactions   • Contrast Dye Hives     Social History     Socioeconomic History   • Marital status:      Spouse name: Not on file   • Number of children: Not on file   • Years of education: Not on file   • Highest education level: Not on file   Tobacco Use   • Smoking status: Former Smoker     Packs/day: 0.25     Years: 20.00     Pack years: 5.00     Types: Cigarettes     Quit date: 2021     Years since quittin.0   • Smokeless tobacco: Never Used   • Tobacco comment: cigars \"doesn't light cigar\"   Substance and Sexual Activity   • Alcohol use: Never     Frequency: Never   • Drug use: Never       Review of Systems  Review of Systems   Constitutional: Positive for unexpected weight change. Negative for chills, fatigue and fever.   HENT: Negative for congestion, ear discharge, hearing loss, nosebleeds and sore throat.    Eyes: Negative for pain, discharge and redness.   Respiratory: Negative for cough, chest tightness, shortness of breath and wheezing.    Cardiovascular: Negative for chest pain and palpitations.   Gastrointestinal: Negative for abdominal distention, abdominal pain, blood in stool, constipation, diarrhea, nausea and vomiting.   Endocrine: Negative for cold intolerance, polydipsia, " "polyphagia and polyuria.   Genitourinary: Negative for dysuria, flank pain, frequency, hematuria and urgency.   Musculoskeletal: Negative for arthralgias, back pain, joint swelling and myalgias.   Skin: Negative for color change, pallor and rash.   Neurological: Negative for tremors, seizures, syncope, weakness and headaches.   Hematological: Negative for adenopathy. Does not bruise/bleed easily.   Psychiatric/Behavioral: Negative for behavioral problems, confusion, dysphoric mood, hallucinations and suicidal ideas. The patient is not nervous/anxious.         /93   Pulse 89   Ht 175.3 cm (69\")   Wt 64.3 kg (141 lb 12.8 oz)   BMI 20.94 kg/m²     Objective    Physical Exam  Constitutional:       Appearance: He is well-developed.   HENT:      Head: Normocephalic and atraumatic.   Eyes:      Conjunctiva/sclera: Conjunctivae normal.      Pupils: Pupils are equal, round, and reactive to light.   Neck:      Musculoskeletal: Normal range of motion and neck supple.      Thyroid: No thyromegaly.   Cardiovascular:      Rate and Rhythm: Normal rate and regular rhythm.      Heart sounds: Normal heart sounds. No murmur.   Pulmonary:      Effort: Pulmonary effort is normal.      Breath sounds: Normal breath sounds. No wheezing.   Abdominal:      General: Bowel sounds are normal. There is no distension.      Palpations: Abdomen is soft. There is no mass.      Tenderness: There is no abdominal tenderness.      Hernia: No hernia is present.   Genitourinary:     Comments: No lesions noted  Musculoskeletal: Normal range of motion.         General: No tenderness.   Lymphadenopathy:      Cervical: No cervical adenopathy.   Skin:     General: Skin is warm and dry.      Findings: No rash.   Neurological:      Mental Status: He is alert and oriented to person, place, and time.      Cranial Nerves: No cranial nerve deficit.   Psychiatric:         Thought Content: Thought content normal.       Lab on 01/26/2021   Component Date " Value Ref Range Status   • Hepatitis C Ab 01/26/2021 Reactive* Non-Reactive Final   • Hepatitis C Quantitation 01/26/2021 HCV Not Detected  IU/mL Final   • Test Information 01/26/2021 Comment   Final    The quantitative range of this assay is 15 IU/mL to 100 million IU/mL.   • ALPHA-FETOPROTEIN 01/26/2021 6.63  0 - 8.3 ng/mL Final   • Glucose 01/26/2021 110* 65 - 99 mg/dL Final   • BUN 01/26/2021 12  8 - 23 mg/dL Final   • Creatinine 01/26/2021 0.72* 0.76 - 1.27 mg/dL Final   • Sodium 01/26/2021 137  136 - 145 mmol/L Final   • Potassium 01/26/2021 3.8  3.5 - 5.2 mmol/L Final   • Chloride 01/26/2021 99  98 - 107 mmol/L Final   • CO2 01/26/2021 27.2  22.0 - 29.0 mmol/L Final   • Calcium 01/26/2021 9.6  8.6 - 10.5 mg/dL Final   • Total Protein 01/26/2021 7.6  6.0 - 8.5 g/dL Final   • Albumin 01/26/2021 4.30  3.50 - 5.20 g/dL Final   • ALT (SGPT) 01/26/2021 157* 1 - 41 U/L Final   • AST (SGOT) 01/26/2021 116* 1 - 40 U/L Final   • Alkaline Phosphatase 01/26/2021 738* 39 - 117 U/L Final   • Total Bilirubin 01/26/2021 3.4* 0.0 - 1.2 mg/dL Final   • eGFR Non African Amer 01/26/2021 110  >60 mL/min/1.73 Final   • Globulin 01/26/2021 3.3  gm/dL Final   • A/G Ratio 01/26/2021 1.3  g/dL Final   • BUN/Creatinine Ratio 01/26/2021 16.7  7.0 - 25.0 Final   • Anion Gap 01/26/2021 10.8  5.0 - 15.0 mmol/L Final   • WBC 01/26/2021 7.63  3.40 - 10.80 10*3/mm3 Final   • RBC 01/26/2021 4.66  4.14 - 5.80 10*6/mm3 Final   • Hemoglobin 01/26/2021 14.8  13.0 - 17.7 g/dL Final   • Hematocrit 01/26/2021 42.3  37.5 - 51.0 % Final   • MCV 01/26/2021 90.8  79.0 - 97.0 fL Final   • MCH 01/26/2021 31.8  26.6 - 33.0 pg Final   • MCHC 01/26/2021 35.0  31.5 - 35.7 g/dL Final   • RDW 01/26/2021 13.6  12.3 - 15.4 % Final   • RDW-SD 01/26/2021 45.3  37.0 - 54.0 fl Final   • MPV 01/26/2021 11.3  6.0 - 12.0 fL Final   • Platelets 01/26/2021 203  140 - 450 10*3/mm3 Final   • Neutrophil % 01/26/2021 69.6  42.7 - 76.0 % Final   • Lymphocyte % 01/26/2021 17.0*  19.6 - 45.3 % Final   • Monocyte % 01/26/2021 9.2  5.0 - 12.0 % Final   • Eosinophil % 01/26/2021 3.0  0.3 - 6.2 % Final   • Basophil % 01/26/2021 0.7  0.0 - 1.5 % Final   • Immature Grans % 01/26/2021 0.5  0.0 - 0.5 % Final   • Neutrophils, Absolute 01/26/2021 5.31  1.70 - 7.00 10*3/mm3 Final   • Lymphocytes, Absolute 01/26/2021 1.30  0.70 - 3.10 10*3/mm3 Final   • Monocytes, Absolute 01/26/2021 0.70  0.10 - 0.90 10*3/mm3 Final   • Eosinophils, Absolute 01/26/2021 0.23  0.00 - 0.40 10*3/mm3 Final   • Basophils, Absolute 01/26/2021 0.05  0.00 - 0.20 10*3/mm3 Final   • Immature Grans, Absolute 01/26/2021 0.04  0.00 - 0.05 10*3/mm3 Final   • nRBC 01/26/2021 0.0  0.0 - 0.2 /100 WBC Final     Assessment/Plan      1. Obstructive jaundice    1.  Obstructive jaundice, likely due to metastatic tumor.  Could also be due to lymphadenopathy.  Proceed with ERCP with CBD stent placement.  Add Levaquin 500 mg p.o. daily.  2.  Metastatic cancer, awaiting chemo.  3.  Weight loss, add p.o. nutritional supplements.      Orders placed during this encounter include:  Orders Placed This Encounter   Procedures   • Obtain Informed Consent     Standing Status:   Future     Order Specific Question:   Informed Consent Given For     Answer:   ERCP       ENDOSCOPIC RETROGRADE CHOLANGIOPANCREATOGRAPHY (N/A)    Review and/or summary of lab tests, radiology, procedures, medications. Review and summary of old records and obtaining of history. The risks and benefits of my recommendations, as well as other treatment options were discussed with the patient and his wife today. Questions were answered.    New Medications Ordered This Visit   Medications   • levoFLOXacin (Levaquin) 500 MG tablet     Sig: Take 1 tablet by mouth Daily for 10 days.     Dispense:  10 tablet     Refill:  0       Follow-up: No follow-ups on file.               Results for orders placed or performed in visit on 01/26/21   CBC Auto Differential    Specimen: Blood   Result Value  Ref Range    WBC 7.63 3.40 - 10.80 10*3/mm3    RBC 4.66 4.14 - 5.80 10*6/mm3    Hemoglobin 14.8 13.0 - 17.7 g/dL    Hematocrit 42.3 37.5 - 51.0 %    MCV 90.8 79.0 - 97.0 fL    MCH 31.8 26.6 - 33.0 pg    MCHC 35.0 31.5 - 35.7 g/dL    RDW 13.6 12.3 - 15.4 %    RDW-SD 45.3 37.0 - 54.0 fl    MPV 11.3 6.0 - 12.0 fL    Platelets 203 140 - 450 10*3/mm3    Neutrophil % 69.6 42.7 - 76.0 %    Lymphocyte % 17.0 (L) 19.6 - 45.3 %    Monocyte % 9.2 5.0 - 12.0 %    Eosinophil % 3.0 0.3 - 6.2 %    Basophil % 0.7 0.0 - 1.5 %    Immature Grans % 0.5 0.0 - 0.5 %    Neutrophils, Absolute 5.31 1.70 - 7.00 10*3/mm3    Lymphocytes, Absolute 1.30 0.70 - 3.10 10*3/mm3    Monocytes, Absolute 0.70 0.10 - 0.90 10*3/mm3    Eosinophils, Absolute 0.23 0.00 - 0.40 10*3/mm3    Basophils, Absolute 0.05 0.00 - 0.20 10*3/mm3    Immature Grans, Absolute 0.04 0.00 - 0.05 10*3/mm3    nRBC 0.0 0.0 - 0.2 /100 WBC   Hepatitis C Antibody    Specimen: Blood   Result Value Ref Range    Hepatitis C Ab Reactive (A) Non-Reactive   AFP Tumor Marker    Specimen: Blood   Result Value Ref Range    ALPHA-FETOPROTEIN 6.63 0 - 8.3 ng/mL   Hepatitis C RNA, Quantitative, PCR (graph)    Specimen: Blood   Result Value Ref Range    Hepatitis C Quantitation HCV Not Detected IU/mL    Test Information Comment    Comprehensive Metabolic Panel    Specimen: Blood   Result Value Ref Range    Glucose 110 (H) 65 - 99 mg/dL    BUN 12 8 - 23 mg/dL    Creatinine 0.72 (L) 0.76 - 1.27 mg/dL    Sodium 137 136 - 145 mmol/L    Potassium 3.8 3.5 - 5.2 mmol/L    Chloride 99 98 - 107 mmol/L    CO2 27.2 22.0 - 29.0 mmol/L    Calcium 9.6 8.6 - 10.5 mg/dL    Total Protein 7.6 6.0 - 8.5 g/dL    Albumin 4.30 3.50 - 5.20 g/dL    ALT (SGPT) 157 (H) 1 - 41 U/L    AST (SGOT) 116 (H) 1 - 40 U/L    Alkaline Phosphatase 738 (H) 39 - 117 U/L    Total Bilirubin 3.4 (H) 0.0 - 1.2 mg/dL    eGFR Non African Amer 110 >60 mL/min/1.73    Globulin 3.3 gm/dL    A/G Ratio 1.3 g/dL    BUN/Creatinine Ratio 16.7 7.0 -  25.0    Anion Gap 10.8 5.0 - 15.0 mmol/L   Results for orders placed or performed in visit on 01/22/21   Alkaline Phosphatase, Isoenzymes    Specimen: Blood   Result Value Ref Range    Alkaline Phosphatase 756 (H) 39 - 117 IU/L    Liver Fraction: 85 13 - 88 %    Bone Fraction: 15 12 - 68 %    Intestinal Frac.: 0 0 - 18 %   Results for orders placed or performed in visit on 01/22/21   Gold Top - SST   Result Value Ref Range    Extra Tube Hold for add-ons.    BUN    Specimen: Blood   Result Value Ref Range    BUN 17 8 - 23 mg/dL   Creatinine, Serum    Specimen: Blood   Result Value Ref Range    Creatinine 0.77 0.76 - 1.27 mg/dL    eGFR Non African Amer 102 >60 mL/min/1.73         This document has been electronically signed by Elba Drew MD on February 4, 2021 19:45 CST

## 2021-02-08 PROBLEM — J43.1 PANLOBULAR EMPHYSEMA (HCC): Status: ACTIVE | Noted: 2021-01-01

## 2021-02-08 PROBLEM — G89.4 CHRONIC PAIN SYNDROME: Status: ACTIVE | Noted: 2021-01-01

## 2021-02-08 PROBLEM — K83.1 COMMON BILE DUCT OBSTRUCTION: Status: ACTIVE | Noted: 2021-01-01

## 2021-02-08 PROBLEM — I10 BENIGN ESSENTIAL HTN: Status: ACTIVE | Noted: 2021-01-01

## 2021-02-08 NOTE — PROGRESS NOTES
2/8/2021    Leopoldo La  1955    Chief Complaint:    Chief Complaint   Patient presents with   • Lung Mass       HPI:      PCP:  Conchis Barkley APRN  Medical Oncology: Dr Sosa    65 y.o. male with HTN(stable, increased risk stroke, rupture) and COPD(stable, increased risk pulmonary complications) , chronic pain syndrome(stable), LEFT lung mass(new, suspicious malignancy).  former smoker.  Moderate hemoptysis episode 1/8/2021.  Moderate LEFT anterior chest wall pain x 2 months. 20lb weight loss over 6 months. PET CT notes positive LEFT upper lobe nodule, hilar adenopathy, multiple bony metastases.  No tissue diagnosis to date.  No TIA stroke amaurosis.  No MI claudication. No other associated signs, symptoms or modifying factors.    1/2021 CXR:  LEFT hilar opacity  1/2021 CT Chest:  30mm LEFT upper lobe nodule lateral, 34mm LEFT hilar adenopathy.  1/2021 PET CT:  30mm LEFT upper lobe nodule lateral (SUV 8.2), 34mm LEFT hilar adenopathy (SUV 20), .level 3 adenopathy 15mm (SUV 10.2), liver (SUV 5.6), RIGHT lateral 3rd rib (SUV 5.5), LEFT postlat 8th rib (SUV 4.7), T9 vert (SUV 12.9), LEFT sacrum (SUV 14.4), RIGHT iliac bone (SUV 20.3), RIGHT acetabulum (SUV 17.8).  Dilated CBD (26mm), gallstones.  2/2021 MRI Abdomen:  Multiple liver lesions.  Dilated CBD.  Gallstones.    The following portions of the patient's history were reviewed and updated as appropriate: allergies, current medications, past family history, past medical history, past social history, past surgical history and problem list.  Recent images independently reviewed.  Available laboratory values reviewed.    PMH:  Past Medical History:   Diagnosis Date   • Abnormal PET scan, lung    • COPD (chronic obstructive pulmonary disease) (CMS/HCC)    • Headache    • Lung nodule      Past Surgical History:   Procedure Laterality Date   • HERNIA REPAIR     • LUMBAR DISC SURGERY     • LUMBAR FUSION     • TONSILLECTOMY       No family history on  "file.  Social History     Tobacco Use   • Smoking status: Former Smoker     Packs/day: 0.25     Years: 20.00     Pack years: 5.00     Types: Cigarettes     Quit date: 2021     Years since quittin.1   • Smokeless tobacco: Never Used   • Tobacco comment: cigars \"doesn't light cigar\"   Substance Use Topics   • Alcohol use: Never     Frequency: Never   • Drug use: Never       ALLERGIES:  Allergies   Allergen Reactions   • Contrast Dye Hives         MEDICATIONS:    Current Outpatient Medications:   •  albuterol sulfate  (90 Base) MCG/ACT inhaler, Inhale 1 puff., Disp: , Rfl:   •  levoFLOXacin (Levaquin) 500 MG tablet, Take 1 tablet by mouth Daily for 10 days., Disp: 10 tablet, Rfl: 0  •  methadone (DOLOPHINE) 10 MG tablet, Take 5 mg by mouth 3 (Three) Times a Day , , Disp: , Rfl:   •  methocarbamol (ROBAXIN) 750 MG tablet, Take 1 tablet by mouth 3 (Three) Times a Day., Disp: 60 tablet, Rfl: 0  •  ondansetron (ZOFRAN) 8 MG tablet, Take 1 tablet by mouth Every 8 (Eight) Hours As Needed for Nausea or Vomiting., Disp: 60 tablet, Rfl: 5  •  oxyCODONE (ROXICODONE) 15 MG immediate release tablet, Take 1 tablet by mouth Every 4 (Four) Hours As Needed for Moderate Pain  or Severe Pain  for up to 14 days., Disp: 84 tablet, Rfl: 0  •  pregabalin (LYRICA) 100 MG capsule, 100 mg 2 (Two) Times a Day., Disp: , Rfl:   •  tamsulosin (FLOMAX) 0.4 MG capsule 24 hr capsule, Take 1 capsule by mouth Daily., Disp: , Rfl:     Review of Systems   Review of Systems   Constitution: Positive for malaise/fatigue and weight loss. Negative for night sweats.   HENT: Negative for hearing loss, hoarse voice and stridor.    Eyes: Negative for vision loss in left eye, vision loss in right eye and visual disturbance.   Cardiovascular: Positive for chest pain and dyspnea on exertion. Negative for leg swelling and palpitations.   Respiratory: Negative for cough, hemoptysis and shortness of breath.    Hematologic/Lymphatic: Negative for " "adenopathy and bleeding problem. Does not bruise/bleed easily.   Skin: Negative for color change, poor wound healing and rash.   Musculoskeletal: Positive for back pain. Negative for arthritis, muscle weakness and neck pain.   Gastrointestinal: Negative for abdominal pain, dysphagia and heartburn.   Neurological: Negative for dizziness, numbness and seizures.   Psychiatric/Behavioral: Negative for altered mental status, depression and memory loss. The patient is not nervous/anxious.        Physical Exam   Vitals:    02/08/21 0822   BP: 150/90   BP Location: Left arm   Patient Position: Sitting   Cuff Size: Adult   Pulse: 96   Temp: 98.1 °F (36.7 °C)   TempSrc: Temporal   SpO2: 98%   Weight: 65.3 kg (144 lb)   Height: 177.8 cm (70\")     Physical Exam  Constitutional:       General: He is not in acute distress.     Appearance: He is not ill-appearing.   HENT:      Head: Atraumatic.      Right Ear: Hearing normal.      Left Ear: Hearing normal.      Nose: No nasal deformity.      Mouth/Throat:      Dentition: Normal dentition. Does not have dentures.   Eyes:      General: Lids are normal.      Conjunctiva/sclera: Conjunctivae normal.      Pupils:      Right eye: Pupil is round and reactive.      Left eye: Pupil is round and reactive.   Neck:      Thyroid: No thyroid mass or thyromegaly.      Vascular: No carotid bruit or JVD.      Trachea: No tracheal deviation.   Cardiovascular:      Rate and Rhythm: Normal rate and regular rhythm.      Pulses:           Carotid pulses are 2+ on the right side and 2+ on the left side.       Radial pulses are 2+ on the right side and 2+ on the left side.        Dorsalis pedis pulses are 2+ on the right side and 2+ on the left side.        Posterior tibial pulses are 2+ on the right side and 2+ on the left side.      Heart sounds: No murmur.   Pulmonary:      Effort: Pulmonary effort is normal.      Breath sounds: Normal breath sounds.   Abdominal:      General: There is no distension. "      Palpations: Abdomen is soft. There is no hepatomegaly, splenomegaly or mass.      Tenderness: There is no abdominal tenderness.   Musculoskeletal:         General: No deformity.      Comments: Gait normal.    Lymphadenopathy:      Cervical: No cervical adenopathy.      Upper Body:      Right upper body: No supraclavicular adenopathy.      Left upper body: No supraclavicular adenopathy.   Skin:     General: Skin is warm and dry.      Coloration: Skin is not pale.      Findings: No erythema.      Comments: No venous staining   Neurological:      Mental Status: He is alert and oriented to person, place, and time.   Psychiatric:         Speech: Speech normal.         Behavior: Behavior is cooperative.         Thought Content: Thought content normal.         Judgment: Judgment normal.         BUN   Date Value Ref Range Status   01/26/2021 12 8 - 23 mg/dL Final     Creatinine   Date Value Ref Range Status   01/26/2021 0.72 (L) 0.76 - 1.27 mg/dL Final     eGFR Non  Amer   Date Value Ref Range Status   01/26/2021 110 >60 mL/min/1.73 Final       ASSESSMENT:  Diagnoses and all orders for this visit:    1. Lung mass (Primary)    2. Obstructive jaundice    3. Panlobular emphysema (CMS/HCC)    4. Benign essential HTN    5. Chronic pain syndrome    PLAN:  Detailed discussion with Leopoldo La regarding situation and options.  LEFT lung nodule, multiple metastases on PET (bone, mediastinal lymph nodes, liver).  LEFT bronchus is clear, bronchoscopy unlikely to yield diagnosis.  Will schedule transthoracic needle biopsy .  Multiple risk factors with severe comorbidities.  No intervention indicated at this time.  Will follow with interval imaging.  Risks, benefits discussed.  Understands and wishes to proceed with plan.    Transthoracic CORE needle biopsy LEFT lung mass 2/9/2021  Risks include pneumothorax with possible chest tube placement and hospitalization, bleeding requiring open surgical repair, nondiagnostic  biopsy requiring additional procedures.    Return after above studies complete  Recommended regular physical activity, progressive walking program.  Continue current medications as directed.  Will Obtain relevant old records.  Advance Care Planning   ACP discussion was declined by the patient. Patient does not have an advance directive, declines further assistance.  Thank you for the opportunity to participate in this patient's care.    Copy to primary care provider.

## 2021-02-10 NOTE — PRE-PROCEDURE NOTE
65 year old male referred for CT guided biopsy of a left lung mass. Procedure explained. Consent obtained. Risks and benefits advised.

## 2021-02-10 NOTE — POST-PROCEDURE NOTE
Pre-Op Diagnosis:  left lung mass  Post-Op Diagnosis: left lung mass  Procedure: ct guided  biopsy  Anesthesia: local, Versed 1mg, and Fentanyl 50mcg iv  EBL: none  Specimens:    sent to LAB / PATH  Findings: Specimen sent, awaiting results  Complications: none  Disposition:  Patient tolerated the procedure well

## 2021-02-12 PROBLEM — R07.81 CHEST PAIN, PLEURITIC: Chronic | Status: ACTIVE | Noted: 2021-01-01

## 2021-02-12 PROBLEM — R07.81 CHEST PAIN, PLEURITIC: Status: ACTIVE | Noted: 2021-01-01

## 2021-02-12 NOTE — TELEPHONE ENCOUNTER
"    Reason for Disposition  • [1] MODERATE difficulty breathing (e.g., speaks in phrases, SOB even at rest, pulse 100-120) AND [2] NEW-onset or WORSE than normal    Additional Information  • Negative: [1] Breathing stopped AND [2] hasn't returned  • Negative: Choking on something  • Negative: Severe difficulty breathing (e.g., struggling for each breath, speaks in single words)  • Negative: Bluish (or gray) lips or face now  • Negative: Difficult to awaken or acting confused (e.g., disoriented, slurred speech)  • Negative: Passed out (i.e., lost consciousness, collapsed and was not responding)  • Negative: Wheezing started suddenly after medicine, an allergic food or bee sting  • Negative: Stridor  • Negative: Slow, shallow and weak breathing  • Negative: Sounds like a life-threatening emergency to the triager  • Negative: Chest pain  • Negative: [1] Wheezing (high pitched whistling sound) AND [2] previous asthma attacks or use of asthma medicines  • Negative: [1] Difficulty breathing AND [2] only present when coughing  • Negative: [1] Difficulty breathing AND [2] only from stuffy or runny nose  • Negative: [1] Difficulty breathing AND [2] within 14 days of COVID-19 Exposure    Answer Assessment - Initial Assessment Questions  1. RESPIRATORY STATUS: \"Describe your breathing?\" (e.g., wheezing, shortness of breath, unable to speak, severe coughing)       Hard to breath   2. ONSET: \"When did this breathing problem begin?\"       Since biopsy and getting worse   3. PATTERN \"Does the difficult breathing come and go, or has it been constant since it started?\"       Constant   4. SEVERITY: \"How bad is your breathing?\" (e.g., mild, moderate, severe)     - MILD: No SOB at rest, mild SOB with walking, speaks normally in sentences, can lay down, no retractions, pulse < 100.     - MODERATE: SOB at rest, SOB with minimal exertion and prefers to sit, cannot lie down flat, speaks in phrases, mild retractions, audible wheezing, " "pulse 100-120.     - SEVERE: Very SOB at rest, speaks in single words, struggling to breathe, sitting hunched forward, retractions, pulse > 120       Moderate   5. RECURRENT SYMPTOM: \"Have you had difficulty breathing before?\" If so, ask: \"When was the last time?\" and \"What happened that time?\"       No   6. CARDIAC HISTORY: \"Do you have any history of heart disease?\" (e.g., heart attack, angina, bypass surgery, angioplasty)       No   7. LUNG HISTORY: \"Do you have any history of lung disease?\"  (e.g., pulmonary embolus, asthma, emphysema)      Yes   8. CAUSE: \"What do you think is causing the breathing problem?\"       Been a problem since biopsy   9. OTHER SYMPTOMS: \"Do you have any other symptoms? (e.g., dizziness, runny nose, cough, chest pain, fever)      Pain   10. PREGNANCY: \"Is there any chance you are pregnant?\" \"When was your last menstrual period?\"        No   11. TRAVEL: \"Have you traveled out of the country in the last month?\" (e.g., travel history, exposures)        No    Protocols used: BREATHING DIFFICULTY-ADULT-AH      "

## 2021-02-12 NOTE — ED PROVIDER NOTES
Subjective   64yo male pmh significant htn/copd/chronic pain syndrome/(L) lung mass with metastases, presents ED POD#2 CT guided lung biopsy c/o left sided pleuritic chest pain/soa/hall.  ROS neg fever/chills/cough/syncope/abd pain/n/v/d.      History provided by:  Patient and spouse  Pain With Breathing  Severity:  Severe  Onset quality:  Sudden  Duration:  2 days  Associated symptoms: chest pain and shortness of breath        Review of Systems   Constitutional: Negative.    HENT: Negative.    Respiratory: Positive for shortness of breath.    Cardiovascular: Positive for chest pain.   Gastrointestinal: Negative.    Genitourinary: Negative.    Skin: Positive for color change.   Allergic/Immunologic: Positive for immunocompromised state.   All other systems reviewed and are negative.      Past Medical History:   Diagnosis Date   • COPD (chronic obstructive pulmonary disease) (CMS/HCC)    • Lung mass        Allergies   Allergen Reactions   • Contrast Dye Hives   • Keflex [Cephalexin] Hives       Past Surgical History:   Procedure Laterality Date   • HERNIA REPAIR     • LUMBAR FUSION         Family History   Problem Relation Age of Onset   • Hypertension Father        Social History     Socioeconomic History   • Marital status:      Spouse name: Not on file   • Number of children: Not on file   • Years of education: Not on file   • Highest education level: Not on file   Tobacco Use   • Smoking status: Former Smoker     Packs/day: 0.25     Years: 20.00     Pack years: 5.00     Types: Cigarettes     Quit date: 2021     Years since quittin.1   • Smokeless tobacco: Never Used   Substance and Sexual Activity   • Alcohol use: Never     Frequency: Never   • Drug use: Never   • Sexual activity: Not Currently           Objective   Physical Exam  Vitals signs and nursing note reviewed.   Constitutional:       Appearance: Normal appearance.   HENT:      Head: Normocephalic and atraumatic.      Mouth/Throat:       Mouth: Mucous membranes are moist.   Eyes:      Pupils: Pupils are equal, round, and reactive to light.   Neck:      Musculoskeletal: Normal range of motion and neck supple. No neck rigidity.   Cardiovascular:      Rate and Rhythm: Normal rate and regular rhythm.      Pulses: Normal pulses.      Heart sounds: Normal heart sounds. No murmur. No friction rub. No gallop.    Pulmonary:      Effort: Pulmonary effort is normal. No respiratory distress.      Breath sounds: Normal breath sounds. No stridor. No wheezing, rhonchi or rales.   Abdominal:      General: Bowel sounds are normal. There is no distension.      Tenderness: There is no abdominal tenderness. There is no guarding or rebound.   Musculoskeletal:         General: No swelling or deformity.   Lymphadenopathy:      Cervical: No cervical adenopathy.   Skin:     General: Skin is warm and dry.      Coloration: Skin is jaundiced.   Neurological:      General: No focal deficit present.      Mental Status: He is alert and oriented to person, place, and time.      GCS: GCS eye subscore is 4. GCS verbal subscore is 5. GCS motor subscore is 6.      Sensory: Sensation is intact.      Motor: Motor function is intact.         Procedures           ED Course  ED Course as of Feb 12 1547 Fri Feb 12, 2021   1325 D/w Dr. Galaviz. Will eval in ER.    [SD]      ED Course User Index  [SD] Damon Syed MD      Labs Reviewed   COMPREHENSIVE METABOLIC PANEL - Abnormal; Notable for the following components:       Result Value    Glucose 152 (*)     ALT (SGPT) 186 (*)     AST (SGOT) 162 (*)     Alkaline Phosphatase 863 (*)     Total Bilirubin 3.3 (*)     All other components within normal limits    Narrative:     GFR Normal >60  Chronic Kidney Disease <60  Kidney Failure <15     CBC WITH AUTO DIFFERENTIAL - Abnormal; Notable for the following components:    Lymphocyte % 14.5 (*)     Immature Grans % 0.6 (*)     All other components within normal limits   D-DIMER, QUANTITATIVE -  Abnormal; Notable for the following components:    D-Dimer, Quantitative 724 (*)     All other components within normal limits    Narrative:     Dimer values <500 ng/ml FEU are FDA approved as aid in diagnosis of deep venous thrombosis and pulmonary embolism.  This test should not be used in an exclusion strategy with pretest probability alone.    A recent guideline regarding diagnosis for pulmonary thromboembolism recommends an adjusted exclusion criterion of age x 10 ng/ml FEU for patients >50 years of age ([place] Intern Med 2015; 163: 701-711).     PROTIME-INR - Normal    Narrative:     Therapeutic range for most indications is 2.0-3.0 INR,  or 2.5-3.5 for mechanical heart valves.   APTT - Normal    Narrative:     The recommended Heparin therapeutic range is 68-97 seconds.   TROPONIN (IN-HOUSE) - Normal    Narrative:     Troponin T Reference Range:  <= 0.03 ng/mL-   Negative for AMI  >0.03 ng/mL-     Abnormal for myocardial necrosis.  Clinicians would have to utilize clinical acumen, EKG, Troponin and serial changes to determine if it is an Acute Myocardial Infarction or myocardial injury due to an underlying chronic condition.       Results may be falsely decreased if patient taking Biotin.     TROPONIN (IN-HOUSE) - Normal    Narrative:     Troponin T Reference Range:  <= 0.03 ng/mL-   Negative for AMI  >0.03 ng/mL-     Abnormal for myocardial necrosis.  Clinicians would have to utilize clinical acumen, EKG, Troponin and serial changes to determine if it is an Acute Myocardial Infarction or myocardial injury due to an underlying chronic condition.       Results may be falsely decreased if patient taking Biotin.     CBC AND DIFFERENTIAL    Narrative:     The following orders were created for panel order CBC & Differential.  Procedure                               Abnormality         Status                     ---------                               -----------         ------                     CBC Auto  Differential[[id number]]        Abnormal            Final result                 Please view results for these tests on the individual orders.     Xr Chest 2 View    Result Date: 2/12/2021  Narrative: [facility] [place] Patient Name: LEVI ISLAS ORDERING: DAREK PASTOR ATTENDING: DAREK PASTOR REFERRING: DAREK PASTOR ----------------------- PROCEDURE: Two-view chest COMPARISON: Focused review of CT chest 1/22/2021 HISTORY: soa FINDINGS: Frontal and lateral views of the chest are obtained. Devices: None Lungs/Pleura: Redemonstrated findings of COPD with superimposed mild fibrotic change. Redemonstration of the known approximate 1.85 x 3.8 cm irregular marginated mass within the peripheral left upper lobe. No acute pulmonary consolidation, pleural effusion, or pneumothorax. Cardiomediastinal Structures: Heart size projects within limits of normal. Redemonstration of 5.2 x 4.2 cm asymmetrical left hilar fullness corresponding to malignant adenopathy seen on the recent CT exam. There is calcified hilar lymph nodes from old granulomatous disease. The trachea is midline. Skeletal Structures: No acute findings. No free air beneath the diaphragm.     Impression: Redemonstrated background of COPD, old rib most disease, and fibrosis. Redemonstration of the known approximate 3.8 cm irregular marginated mass within the left upper lobe laterally/peripherally and malignant left hilar adenopathy. Electronically signed by:  Kenneth Plummer MD  2/12/2021 11:54 AM CST Workstation: [id number]    Ct Chest With Contrast Diagnostic    Result Date: 1/22/2021  Narrative: CT Chest With Contrast History: Cough. Chest radiograph demonstrated a left upper lobe mass with associated left hilar lymphadenopathy. Axial spiral scans of the chest were obtained with  intravenous contrast.  Coronal and sagital reconstructions were preformed. This exam was performed according to our departmental dose-optimization program, which includes  automated exposure control, adjustment of the mA and/or kV according to patient size and/or use of iterative reconstruction technique. DLP: 165.70 Comparison: None. Correlation with chest radiograph January 21, 2021. Findings: Chronic obstructive pulmonary disease with peripheral honeycombing. Irregular spiculated mass peripherally in the anterior segment left upper lobe abutting the pleural surface and measuring 3.2 cm in greatest dimension. Appearance is consistent with bronchogenic carcinoma. Associated bulky left hilar lymphadenopathy with mediastinal extension. Old granulomatous disease. No pleural effusion. No pneumothorax. No axillary adenopathy. No thoracic aortic aneurysm. No pericardial effusion. Coronary artery calcifications. Cholelithiasis. Intrahepatic and extrahepatic biliary dilatation with the common bile duct dilated up to 2.9 cm. Distal common bile duct not included on this study. Splenic granulomata. No acute osseous abnormality. Degenerative changes in the thoracic spine.     Impression: Conclusion: Chronic obstructive pulmonary disease with peripheral honeycombing. Irregular spiculated mass peripherally in the anterior segment left upper lobe abutting the pleural surface and measuring 3.2 cm in greatest dimension. Appearance is consistent with bronchogenic carcinoma. Associated bulky left hilar lymphadenopathy with mediastinal extension. Coronary artery calcifications. Cholelithiasis. Intrahepatic and extrahepatic biliary dilatation with the common bile duct dilated up to 2.9 cm. Distal common bile duct not included on this study. [id number] Electronically signed by:  Jadon Dahl MD  1/22/2021 8:30 PM CST Workstation: [id number]    Nm Lung Scan Perfusion Particulate    Result Date: 2/12/2021  Narrative: Nuclear medicine perfusion scan. CLINICAL INDICATION: Chest pain, shortness of breath. Evaluate for pulmonary embolism. COMPARISON: Chest February 12, 2021.   TECHNIQUE:  Ventilation images deferred,  due to Covid    . 3.4   mCi Tc-99m MAA administered intravenously. Perfusion images obtained in multiple projections. FINDINGS: Large  lobar perfusion defect left upper lobe. This is most likely secondary to the large left upper lobe tumor. No additional perfusion defects are noted. This suggests a low probability for pulmonary emboli.     Impression: Low probability for pulmonary thromboemboli. COMMENTS: Normal exam = statistically less than 2% have pulmonary emboli. Low probability = statistically 5-19% have pulmonary emboli. Intermediate probability = statistically 20-70% have pulmonary emboli. High probability = statistically greater than 80% have pulmonary emboli. Electronically signed by:  Frederick Fonseca MD  2/12/2021 3:37 PM CST Workstation: [id number]    Mri Abdomen W Wo Contrast Mrcp    Result Date: 2/3/2021  Narrative: Procedure: MRI abdomen with and without contrast and MRCP Reason for exam: Cholelithiasis. FINDINGS: Multisequence multiplanar MR imaging of the abdomen was performed with without contrast. 3-D MIPS reconstruction was performed of the biliary system and MRCP was performed. Dome of the anterior segment right lobe of liver small 1 cm circular lesion which is lower signal centrally on T1 weighting and higher signal on T2 weighting and has a rim of peripheral enhancement. The adjacent smaller satellite lesion with similar imaging characteristics. Additional medial dome of anterior segment right lobe of liver subcentimeter lesion with similar imaging characteristics as prior described lesions is seen. Inferior anterior segment right lobe of liver small 8.7 mm oval lesion which is low signal on T1 weighting and higher signal on T2 weighting and has a peripheral rim of enhancement. Additional medial segment left lobe of liver inferior subcentimeter lesion with similar imaging characteristics as prior described liver lesions is seen. The liver is otherwise unremarkable. Diffuse marked intra and  extrahepatic biliary dilatation with focal caliber change of the proximal common bile duct in the region of the head of the pancreas. The gallbladder is distended with multiple small gallstones dependently within it. Multiple small gallstones are also seen within the cystic duct. The pancreas is unremarkable. The spleen is normal. Bilateral adrenal glands are normal. Bilateral kidneys are normal. The hollow viscera is unremarkable.     Impression: 1.  Multiple small liver lesions as described above highly suspicious for early metastatic disease. 2.  Intra and extrahepatic marked biliary dilatation with focal caliber change in the region of the proximal common bile duct. This may represent changes from benign and/or malignant stricturing in this region. 3.  Cholelithiasis. Multiple small gallstones are seen within the cystic duct. 4.  Remainder of MR abdomen and MRCP is unremarkable. Electronically signed by:  Erwin Rosales MD  2/3/2021 8:30 AM CST Workstation: [id number]    Ct Needle Biopsy Lung    Result Date: 2/10/2021  Narrative: CT needle biopsy left lung CLINICAL INDICATION: 65-year-old male with a large left hilar mass and a small left upper lobe peripheral mass. Patient is referred for biopsy of the smaller left upper lobe peripheral mass. COMPARISON: CT chest January 22, 2021.   TECHNIQUE: Nonionic IV contrast. Helical scanning with axial and coronal reformations. Soft tissue, lung, liver, and bone windows reviewed. This exam was performed according to our departmental dose-optimization program, which includes automated exposure control, adjustment of the mA and/or kV according to patient size and/or use of iterative reconstruction technique. CT FINDINGS:  Following informed consent the patient is placed in RPO oblique positioning for better evaluation of the peripheral left upper lobe mass. Biopsies performed of this mass using a 20-gauge core needle for histopathology and 25-gauge needle for cytology.   Ronak Mancilla was on hand to ensure adequacy of tissue specimen. During the  procedure nursing administered sedation, 1 mg of Versed and 50 mcg of fentanyl IV ovary 25 minute observation period. The patient left the CT suite in stable condition with normal vital signs. No evidence of immediate complications. No pneumothorax.     Impression: Technically successful and uneventful CT guided biopsy left upper lobe mass. Electronically signed by:  Frederick Fonseca MD  2/10/2021 12:29 PM CST Workstation: [id number]    Nm Pet Skull Base To Mid Thigh    Result Date: 1/29/2021  Narrative: EXAM:  Nuclear Medicine Body PET/CT COMPARISON EXAMINATIONS: CT chest dated 1/22/2021. HISTORY: Nodule of left lung, R91.1 Solitary pulmonary nodule Dose:  10.4 mCi of F-18 FDG, I.V. Glucose:  93 mg / dl (capillary blood glucose measured at the time of injection) IV contrast: None After allowing for an appropriate amount of time for uptake of the radiotracer, tomographic image acquisition was performed with PET and CT, from the base of the brain to the mid thighs. Anatomic imaging was performed during normal tidal respiration which potentially creates edge distortion due to motion artifact(s). The CT examination was performed without intravenous / oral contrast for the purpose of attenuation correction and is considered limited and 'non-diagnostic'  which precludes evaluation of solid organ parenchyma, characterization of solid masses, or depiction of subtle vascular pathology.  These non-diagnostic, non-contrast enhanced CT images obtained during tidal respiration, provide only a limited assessment of solid and hollow viscera, and do not replace diagnostic quality contrast enhanced CT scans. The PET and CT images were reconstructed in the axial, coronal, and sagittal planes and viewed independently as well as in a co-registered fashion.  -------------- FINDINGS: HEAD and NECK: Two small nonspecific foci of mild uptake in the right side of  the larynx with SUV max of 4.0. No suspicious CT findings in this area. THORAX: Large left hilar/perihilar/mediastinal mass with hypermetabolic activity, SUV max 20.4. Peripheral left upper lobe mass with hypermetabolic activity, SUV max of 8.2. Anterior mediastinal slightly enlarged lymph node with hypermetabolic activity, SUV max 10.2. Aorticopulmonary window region lymphadenopathy with SUV max of 20.1. Nonspecific focus of mild hypermetabolic activity in the right upper lobe with SUV max of 3.6, no definite CT correlate. ABDOMEN/PELVIS: Hypermetabolic focus of activity in the left lobe of the liver, SUV max 5.6. Hypermetabolic focus of activity in either the right lobe of the liver or right anterolateral chest wall with SUV max of 13.1. OSSEOUS / MISC: Hypermetabolic activity noted in the right lateral third rib with SUV max of 5.5. Hypermetabolic activity in the region of the left posterolateral eighth rib with SUV max of 4.7. Hypermetabolic activity in the left posterior elements of the T9 and the vertebral body of T9, 12.9. Hypermetabolic focus of activity in the left sacral alum, SUV max 14.4. Hypermetabolic focus in the right iliac bone just above the acetabulum with SUV max of 20.3. Hypermetabolic focus in the posterior rim of the right acetabulum with SUV max of 18.3. Hypermetabolic focus in the right proximal femoral shaft with SUV max of 17.8. ADDITIONAL FINDINGS: Cholelithiasis. Markedly dilated common bile duct and mild intrahepatic biliary dilatation. Common bile duct measures 2.6 cm in diameter. Markedly dilated cystic duct measuring 1.3 cm in diameter, with layering stones in the cystic duct. No definite common bile duct stones are seen. There is mild-to-moderate atherosclerotic calcification of the chest abdomen and pelvis.     Impression: CONCLUSION: 1.  Marked hypermetabolic activity associated with the left hilar/perihilar/mediastinal mass, SUV max 20.4. 2.  Left upper lobe peripheral mass. 3.   Multiple sites of metastatic activity seen in the chest, liver, and osseous structures as described above. 4.  Mild nonspecific activity in the right side of the larynx. 5.  Markedly dilated common bile duct and mild intrahepatic biliary dilatation. No definite CBD stones are seen. 6.  Cholelithiasis and a markedly dilated cystic duct measuring 1.3 cm in diameter, with layering stones in the cystic duct. The cystic duct appears to communicate freely with the dilated common bile duct Electronically signed by:  Daniel Gotti MD  1/29/2021 4:40 PM CST Workstation: [id number]                                         MDM  Number of Diagnoses or Management Options  Mass of left lung:   Obstructive jaundice:   Pleuritic chest pain:   Diagnosis management comments: Labs/radiographic studies/nuclear medicine study reviewed. Lala neg x2. ekg neg ischemia. VQ scan low probability for PE.  Wells PE score=1 (low risk). Sao2 99% RA. Afvss. Pt resting comfortably. No evidence pneumothorax/pneumonia.  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  Stable discharge with oncology/GI followup.       Amount and/or Complexity of Data Reviewed  Clinical lab tests: reviewed  Tests in the radiology section of CPT®: reviewed  Tests in the medicine section of CPT®: reviewed  Decide to obtain previous medical records or to obtain history from someone other than the patient: yes        Final diagnoses:   Pleuritic chest pain   Mass of left lung   Obstructive jaundice            Damon Syed MD  02/12/21 1544

## 2021-02-12 NOTE — DISCHARGE INSTRUCTIONS
Return ED chest pain, shortness of air, bleeding, worse condition, any other concerns  Followup primary GI regarding planned common bile duct stent  Followup primary oncology as directed

## 2021-02-16 NOTE — TELEPHONE ENCOUNTER
Patient called, wants to speak with Dr. Ian ding. Did not show for Pre Covid Screen, Canceled CT Bx that was scheduled.     Numerous concerns, and is going to be out of medication.

## 2021-02-16 NOTE — TELEPHONE ENCOUNTER
Contacted pt because his had missed his covid test, he stated he wanted to cancel everything and he does not want to proceed at this time. He stated he would call us when he is ready for this procedure. I contacted coretta as well to put him in the depot

## 2021-02-17 NOTE — TELEPHONE ENCOUNTER
Called patient. Discussed with him. He is requesting oxycodone refill which was arranged.   I discussed with him about cancelled tests/biopsy.   Discussed with patient that initial lung biopsy was non-diagnostic. This did not show malignant cells. He reported severe pain after biopsy. He missed his ERCP appointment after biopsy due to pain. He tells me he is getting more jaundiced. He wants to get ERCP done first and then consider biopy of lung mass.   I believe that would be reasonable.

## 2021-02-17 NOTE — TELEPHONE ENCOUNTER
I have communicated with pt. who has concerns regarding pain medication refill needed tomorrow and no communication from [facility] regarding re-scheduled ERCP appointment. Assured him that we will address pain med refill early tomorrow morning, confirm ERCP appt at [facility] (verbally conveyed as being Monday, 2/22/21) and ensure he has Covid-19 testing instructions prior to procedure. Telehealth visit by Dr. Sosa planned for tomorrow, 2/17/21.  Contacted Dr. Sosa who is placing call to pt today.

## 2021-02-18 NOTE — PROGRESS NOTES
LUNG NAVIGATION    ENCOUNTER TYPE: Phone    Received call today, 2/18/21 from Chela at [facility] regarding pt’s ERCP appt. Multiple appts have been cancelled by pt due to weather and ER visit, with most recent being on 2/16/21 due to inclement weather. Upon attempt by [facility] to reschedule, pt indicated he wanted to wait at this time. I felt this to be confused with pt’s desire to cancel lung biopsy only and requested Chela pursue next available ERCP while I clarified pt’s wishes.    Contacted pt’s wife who confirmed that pt elected to delay lung biopsy at this time while pursuing ERCP at earliest appt available. Chela scheduled ERCP for tomorrow, 2/19/21 with 11:30 check-in and 1:30 procedure; verbal confirmation of appt received from pt and pt’s wife. All procedure instructions emailed to pt’s wife.  Appreciation expressed to Chela for her diligence in getting this pt scheduled expeditiously.  Dr. Sosa and his RN, Randall contacted to inform of new appt time

## 2021-02-19 NOTE — TELEPHONE ENCOUNTER
Leopoldo's wife Debora says the [facility] that the medication was called in to is out of stock of the OxyCODONE 15MG. She says they have 10MG if we want to send in a new Rx. She's otherwise asking for rx to be sent to [facility] pharmacy. Asking for a call back with an update.    [id number] - [place], [place] - [facility] [address] [facility] AT [facility] [address]  [address] [phone] [facility] [phone] FX

## 2021-02-25 NOTE — TELEPHONE ENCOUNTER
----- Message from Stacie Black sent at 2/25/2021 10:45 AM CST -----  Jailene from the [facility] has called back about Mr. La saying they got his path back and he has small cell, so they are going to hold off on rescheduling his surgery.

## 2021-03-01 PROBLEM — C34.90 SMALL CELL LUNG CANCER (HCC): Status: ACTIVE | Noted: 2021-01-01

## 2021-03-01 NOTE — PROGRESS NOTES
Oncology SW met with pt and his wife as he presents to begin chemotherapy.  Pt. Was seen today by KALI and completed chemotherapy education. He wife accompanied him for this visit. Pt. Was then escorted to radiology where he underwent PICC line placement with plan to begin treatment on Wednesday. Pt. Presents in a wheelchair and requiring assistance with ambulation . He does have cane for support.  Pt. Describing significant issues with pain that are being addressed by medical oncology with prescriptions for Fentanyl called to pharmacy. Pre certification is in process but may take 24 hours to obtain. Pt. Cannot purchase out of pocket the full price.    Lung Navigator, Jailene Alford and herminio accompanied pt to radiology, allowing time to discuss pt needs, coping and supports. Pt. Evidences a bright affect and normal mood, he presents verbose and engages openly.  Wife departed to go to pharmacy while pt underwent PICC placement.  Wife presents visibly stressed and overwhelmed and collaborated her distress and worry for patient.      Pt. Shared his goals for quality and quantity of life, stating realistic expectations and understanding of the severity of his illness.  Pt. States his motivation to fight and focuses on the need for pain relief to obtain quality.  SW and Navigator offered emotional support, recognition of strengths and positives and stated encouragement an ongoing support. Undersigned will continue to follow and support and reach out further to wife as she is experiencing caregiver stress.  Plans to meet up with pt/wife on Wednesday.

## 2021-03-01 NOTE — PATIENT INSTRUCTIONS
Carboplatin injection  What is this medicine?  CARBOPLATIN (MARIANA chapo janet tin) is a chemotherapy drug. It targets fast dividing cells, like cancer cells, and causes these cells to die. This medicine is used to treat ovarian cancer and many other cancers.  This medicine may be used for other purposes; ask your health care provider or pharmacist if you have questions.  COMMON BRAND NAME(S): Paraplatin  What should I tell my health care provider before I take this medicine?  They need to know if you have any of these conditions:  · blood disorders  · hearing problems  · kidney disease  · recent or ongoing radiation therapy  · an unusual or allergic reaction to carboplatin, cisplatin, other chemotherapy, other medicines, foods, dyes, or preservatives  · pregnant or trying to get pregnant  · breast-feeding  How should I use this medicine?  This drug is usually given as an infusion into a vein. It is administered in a hospital or clinic by a specially trained health care professional.  Talk to your pediatrician regarding the use of this medicine in children. Special care may be needed.  Overdosage: If you think you have taken too much of this medicine contact a poison control center or emergency room at once.  NOTE: This medicine is only for you. Do not share this medicine with others.  What if I miss a dose?  It is important not to miss a dose. Call your doctor or health care professional if you are unable to keep an appointment.  What may interact with this medicine?  · medicines for seizures  · medicines to increase blood counts like filgrastim, pegfilgrastim, sargramostim  · some antibiotics like amikacin, gentamicin, neomycin, streptomycin, tobramycin  · vaccines  Talk to your doctor or health care professional before taking any of these medicines:  · acetaminophen  · aspirin  · ibuprofen  · ketoprofen  · naproxen  This list may not describe all possible interactions. Give your health care provider a list of all the  medicines, herbs, non-prescription drugs, or dietary supplements you use. Also tell them if you smoke, drink alcohol, or use illegal drugs. Some items may interact with your medicine.  What should I watch for while using this medicine?  Your condition will be monitored carefully while you are receiving this medicine. You will need important blood work done while you are taking this medicine.  This drug may make you feel generally unwell. This is not uncommon, as chemotherapy can affect healthy cells as well as cancer cells. Report any side effects. Continue your course of treatment even though you feel ill unless your doctor tells you to stop.  In some cases, you may be given additional medicines to help with side effects. Follow all directions for their use.  Call your doctor or health care professional for advice if you get a fever, chills or sore throat, or other symptoms of a cold or flu. Do not treat yourself. This drug decreases your body's ability to fight infections. Try to avoid being around people who are sick.  This medicine may increase your risk to bruise or bleed. Call your doctor or health care professional if you notice any unusual bleeding.  Be careful brushing and flossing your teeth or using a toothpick because you may get an infection or bleed more easily. If you have any dental work done, tell your dentist you are receiving this medicine.  Avoid taking products that contain aspirin, acetaminophen, ibuprofen, naproxen, or ketoprofen unless instructed by your doctor. These medicines may hide a fever.  Do not become pregnant while taking this medicine. Women should inform their doctor if they wish to become pregnant or think they might be pregnant. There is a potential for serious side effects to an unborn child. Talk to your health care professional or pharmacist for more information. Do not breast-feed an infant while taking this medicine.  What side effects may I notice from receiving this  medicine?  Side effects that you should report to your doctor or health care professional as soon as possible:  · allergic reactions like skin rash, itching or hives, swelling of the face, lips, or tongue  · signs of infection - fever or chills, cough, sore throat, pain or difficulty passing urine  · signs of decreased platelets or bleeding - bruising, pinpoint red spots on the skin, black, tarry stools, nosebleeds  · signs of decreased red blood cells - unusually weak or tired, fainting spells, lightheadedness  · breathing problems  · changes in hearing  · changes in vision  · chest pain  · high blood pressure  · low blood counts - This drug may decrease the number of white blood cells, red blood cells and platelets. You may be at increased risk for infections and bleeding.  · nausea and vomiting  · pain, swelling, redness or irritation at the injection site  · pain, tingling, numbness in the hands or feet  · problems with balance, talking, walking  · trouble passing urine or change in the amount of urine  Side effects that usually do not require medical attention (report to your doctor or health care professional if they continue or are bothersome):  · hair loss  · loss of appetite  · metallic taste in the mouth or changes in taste  This list may not describe all possible side effects. Call your doctor for medical advice about side effects. You may report side effects to FDA at 7-415-FDA-3998.  Where should I keep my medicine?  This drug is given in a hospital or clinic and will not be stored at home.  NOTE: This sheet is a summary. It may not cover all possible information. If you have questions about this medicine, talk to your doctor, pharmacist, or health care provider.  © 2021 Elsevier/Gold Standard (2009-03-24 14:38:05)  Etoposide, -16 injection  What is this medicine?  ETOPOSIDE, -16 (e toe JP side) is a chemotherapy drug. It is used to treat testicular cancer, lung cancer, and other cancers.  This  medicine may be used for other purposes; ask your health care provider or pharmacist if you have questions.  COMMON BRAND NAME(S): Etopophos, Toposar, VePesid  What should I tell my health care provider before I take this medicine?  They need to know if you have any of these conditions:  · infection  · kidney disease  · liver disease  · low blood counts, like low white cell, platelet, or red cell counts  · an unusual or allergic reaction to etoposide, other medicines, foods, dyes, or preservatives  · pregnant or trying to get pregnant  · breast-feeding  How should I use this medicine?  This medicine is for infusion into a vein. It is administered in a hospital or clinic by a specially trained health care professional.  Talk to your pediatrician regarding the use of this medicine in children. Special care may be needed.  Overdosage: If you think you have taken too much of this medicine contact a poison control center or emergency room at once.  NOTE: This medicine is only for you. Do not share this medicine with others.  What if I miss a dose?  It is important not to miss your dose. Call your doctor or health care professional if you are unable to keep an appointment.  What may interact with this medicine?  This medicine may interact with the following medications:  · warfarin  This list may not describe all possible interactions. Give your health care provider a list of all the medicines, herbs, non-prescription drugs, or dietary supplements you use. Also tell them if you smoke, drink alcohol, or use illegal drugs. Some items may interact with your medicine.  What should I watch for while using this medicine?  Visit your doctor for checks on your progress. This drug may make you feel generally unwell. This is not uncommon, as chemotherapy can affect healthy cells as well as cancer cells. Report any side effects. Continue your course of treatment even though you feel ill unless your doctor tells you to stop.  In some  cases, you may be given additional medicines to help with side effects. Follow all directions for their use.  Call your doctor or health care professional for advice if you get a fever, chills or sore throat, or other symptoms of a cold or flu. Do not treat yourself. This drug decreases your body's ability to fight infections. Try to avoid being around people who are sick.  This medicine may increase your risk to bruise or bleed. Call your doctor or health care professional if you notice any unusual bleeding.  Talk to your doctor about your risk of cancer. You may be more at risk for certain types of cancers if you take this medicine.  Do not become pregnant while taking this medicine or for at least 6 months after stopping it. Women should inform their doctor if they wish to become pregnant or think they might be pregnant. Women of child-bearing potential will need to have a negative pregnancy test before starting this medicine. There is a potential for serious side effects to an unborn child. Talk to your health care professional or pharmacist for more information. Do not breast-feed an infant while taking this medicine.  Men must use a latex condom during sexual contact with a woman while taking this medicine and for at least 4 months after stopping it. A latex condom is needed even if you have had a vasectomy. Contact your doctor right away if your partner becomes pregnant. Do not donate sperm while taking this medicine and for at least 4 months after you stop taking this medicine. Men should inform their doctors if they wish to father a child. This medicine may lower sperm counts.  What side effects may I notice from receiving this medicine?  Side effects that you should report to your doctor or health care professional as soon as possible:  · allergic reactions like skin rash, itching or hives, swelling of the face, lips, or tongue  · low blood counts - this medicine may decrease the number of white blood  cells, red blood cells, and platelets. You may be at increased risk for infections and bleeding  · nausea, vomiting  · redness, blistering, peeling or loosening of the skin, including inside the mouth  · signs and symptoms of infection like fever; chills; cough; sore throat; pain or trouble passing urine  · signs and symptoms of low red blood cells or anemia such as unusually weak or tired; feeling faint or lightheaded; falls; breathing problems  · unusual bruising or bleeding  Side effects that usually do not require medical attention (report to your doctor or health care professional if they continue or are bothersome):  · changes in taste  · diarrhea  · hair loss  · loss of appetite  · mouth sores  This list may not describe all possible side effects. Call your doctor for medical advice about side effects. You may report side effects to FDA at 3-153-FDA-4077.  Where should I keep my medicine?  This drug is given in a hospital or clinic and will not be stored at home.  NOTE: This sheet is a summary. It may not cover all possible information. If you have questions about this medicine, talk to your doctor, pharmacist, or health care provider.  © 2021 [employer]vier/Gold Standard (2020-02-12 16:57:15)  Atezolizumab injection  What is this medicine?  ATEZOLIZUMAB (a te carol ALDEN ue mab) is a monoclonal antibody. It is used to treat bladder cancer (urothelial cancer), liver cancer, lung cancer, breast cancer, and melanoma.  This medicine may be used for other purposes; ask your health care provider or pharmacist if you have questions.  COMMON BRAND NAME(S): Tecentriq  What should I tell my health care provider before I take this medicine?  They need to know if you have any of these conditions:  · autoimmune diseases like Crohn's disease, ulcerative colitis, or lupus  · have had or planning to have an allogeneic stem cell transplant (uses someone else's stem cells)  · history of organ transplant  · history of radiation to the  chest  · nervous system problems like myasthenia gravis or Guillain-[place] syndrome  · an unusual or allergic reaction to atezolizumab, other medicines, foods, dyes, or preservatives  · pregnant or trying to get pregnant  · breast-feeding  How should I use this medicine?  This medicine is for infusion into a vein. It is given by a health care professional in a hospital or clinic setting.  A special MedGuide will be given to you before each treatment. Be sure to read this information carefully each time.  Talk to your pediatrician regarding the use of this medicine in children. Special care may be needed.  Overdosage: If you think you have taken too much of this medicine contact a poison control center or emergency room at once.  NOTE: This medicine is only for you. Do not share this medicine with others.  What if I miss a dose?  It is important not to miss your dose. Call your doctor or health care professional if you are unable to keep an appointment.  What may interact with this medicine?  Interactions have not been studied.  This list may not describe all possible interactions. Give your health care provider a list of all the medicines, herbs, non-prescription drugs, or dietary supplements you use. Also tell them if you smoke, drink alcohol, or use illegal drugs. Some items may interact with your medicine.  What should I watch for while using this medicine?  Your condition will be monitored carefully while you are receiving this medicine.  You may need blood work done while you are taking this medicine.  Do not become pregnant while taking this medicine or for at least 5 months after stopping it. Women should inform their doctor if they wish to become pregnant or think they might be pregnant. There is a potential for serious side effects to an unborn child. Talk to your health care professional or pharmacist for more information. Do not breast-feed an infant while taking this medicine or for at least 5 months  after the last dose.  What side effects may I notice from receiving this medicine?  Side effects that you should report to your doctor or health care professional as soon as possible:  · allergic reactions like skin rash, itching or hives, swelling of the face, lips, or tongue  · black, tarry stools  · bloody or watery diarrhea  · breathing problems  · changes in vision  · chest pain or chest tightness  · chills  · facial flushing  · fever  · headache  · signs and symptoms of high blood sugar such as dizziness; dry mouth; dry skin; fruity breath; nausea; stomach pain; increased hunger or thirst; increased urination  · signs and symptoms of liver injury like dark yellow or brown urine; general ill feeling or flu-like symptoms; light-colored stools; loss of appetite; nausea; right upper belly pain; unusually weak or tired; yellowing of the eyes or skin  · stomach pain  · trouble passing urine or change in the amount of urine  Side effects that usually do not require medical attention (report to your doctor or health care professional if they continue or are bothersome):  · bone pain  · cough  · diarrhea  · joint pain  · muscle pain  · muscle weakness  · swelling of arms or legs  · tiredness  · weight loss  This list may not describe all possible side effects. Call your doctor for medical advice about side effects. You may report side effects to FDA at 6-677-IGZ-2935.  Where should I keep my medicine?  This drug is given in a hospital or clinic and will not be stored at home.  NOTE: This sheet is a summary. It may not cover all possible information. If you have questions about this medicine, talk to your doctor, pharmacist, or health care provider.  © 2021 [employer]vier/Gold Standard (2020-11-18 23:27:43)

## 2021-03-01 NOTE — TELEPHONE ENCOUNTER
I called patient.   He said that he thought he was suppose to have 75 mcg patches instead of 25 mcg.

## 2021-03-01 NOTE — PROGRESS NOTES
CHEMOTHERAPY PREPARATION    Leopoldo La  [id number]  1955    Chief Complaint: chemo education     History of present illness:  Leopoldo La is a 65 y.o. year old male who is here today for chemotherapy preparation and needs assessment. The patient has been diagnosed with metastatic small cell cancer and is scheduled to begin treatment with Carboplatin,Etoposide and Atezolizumab.     Oncology History:    Oncology/Hematology History   Small cell lung cancer (CMS/HCC)   3/1/2021 Initial Diagnosis    Small cell lung cancer (CMS/HCC)     3/3/2021 -  Chemotherapy    OP LUNG Atezolizumab / CARBOplatin AUC=5 / Etoposide (SCLC)         Past Medical History:   Diagnosis Date   • COPD (chronic obstructive pulmonary disease) (CMS/HCC)    • Lung mass        Past Surgical History:   Procedure Laterality Date   • HERNIA REPAIR     • LUMBAR FUSION         MEDICATIONS: The current medication list was reviewed and reconciled.     Allergies:  is allergic to contrast dye and keflex [cephalexin].    Family History   Problem Relation Age of Onset   • Hypertension Father          Review of Systems    Physical Exam  Vital Signs: There were no vitals taken for this visit.   General Appearance:  alert, cooperative, no apparent distress and appears stated age   Neurologic/Psychiatric: A&O x 3, gait steady, appropriate affect   HEENT:  Normocephalic, without obvious abnormality, mucous membranes moist   Lungs:   Clear to auscultation bilaterally; respirations regular, even, and unlabored bilaterally   Heart:  Regular rate and rhythm, no murmurs appreciated   Extremities: Normal, atraumatic; no clubbing, cyanosis, or edema    Skin: No rashes, lesions, or abnormal coloration noted     ECOG Performance Status: (1) Restricted in Physically Strenuous Activity, Ambulatory & Able to Do Work of Light Nature          NEEDS ASSESSMENTS    Genetics  The patient's new diagnosis and family history have been reviewed for genetic counseling  "needs. A genetic referral is not recommended.     Psychosocial  The patient has completed a PHQ-9 Depression Screening   PHQ-9 results show 0 (No Depression).   The patient and wife both meet with Ellie Zhang LCSW today; please see her note for complete details.  Copies of patient's questionnaires will be scanned into EMR for details and further reference.    Barriers to care  A barriers form was also completed by the patient today. We discussed services offered by our facility to help him have adequate access to care. Based upon barriers assessment today, the patient will not require a follow-up call from the  to further discuss needs.   A copy of the barriers form will also be scanned into EMR for details and further reference.     VAD Assessment  The patient and I discussed planned intervenous chemotherapy as well as other IV treatments that are often needed throughout the course of treatment. These may include, but are not limited to blood transfusions, antibiotics, and IV hydration. The vasculature does not appear to be adequate for multiple peripheral IVs throughout their treatment course. Discussed risks and benefits of VADs. The patient is having PICC line placement today.     Advanced Care Planning  The patient and I discussed advanced care planning, \"Conversations that Matter\".   This service was offered, free of charge, for development of advance directives with a certified ACP facilitator.  The patient does not have an up-to-date advanced directive. This document is not on file with our office. The patient is interested in an appointment with one of our facilitators to create or update their advanced directives.      Palliative Care  The patient and I discussed palliative care services. Palliative care is not the same as Hospice care. This is specialized medical care for people living with serious illness with the goal of improving quality of life for the patient and their family. [facility] has " partnered with [facility] to offer our patients outpatient palliative care early along with their treatment to assist in coordination of care, symptom management, pain management, and medical decision making.  Oncology criteria for palliative care referral is met at this time. The patient is not interested in a palliative care consultation.  Additional Referral needs  none      CHEMOTHERAPY EDUCATION    Booklets Given: Chemotherapy education sheet on Carboplatin, Etoposide, and Atezolizumab and [facility] chemotherapy binder      Chemotherapy/Biotherapy Education Sheets: (list all that apply)  nausea management, acid reflux management, diarrhea management, Cancer resourse contacts information, skin and mouth care and vaccination information                                                                                                                                                                 Chemotherapy Regimen:   Treatment Plans     Name Type Plan Dates Plan Provider         Active    OP LUNG Atezolizumab / CARBOplatin AUC=5 / Etoposide (SCLC) ONCOLOGY TREATMENT  3/2/2021 - Present Arianne Mckeon MD                    TOPICS EDUCATION PROVIDED COMMENTS   ANEMIA:  role of RBC, cause, s/s, ways to manage, role of transfusion [x] discussed possible need for blood transfusion while on chemotherapy   THROMBOCYTOPENIA:  role of platelet, cause, s/s, ways to prevent bleeding, things to avoid, when to seek help [x] Discussed possible need for platelet transfusion while on chemotherapy   NEUTROPENIA:  role of WBC, cause, infection precautions, s/s of infection, when to call MD [x] Discussed neutropenic fever and when to notify clinic of any temp 100.4 or greater; verified pt has thermo ter at home   NUTRITION & APPETITE CHANGES:  importance of maintaining healthy diet & weight, ways to manage to improve intake, dietary consult, exercise regimen [x] Will notify Lala Valenzuela RD of [facility]  start patient   DIARRHEA:  causes, s/s of dehydration, ways to manage, dietary changes, when to call MD [x]    CONSTIPATION:  causes, ways to manage, dietary changes, when to call MD [x]    NAUSEA & VOMITING:  cause, use of antiemetics, dietary changes, when to call MD [x] Pt has medication for nausea at home; discussed how to take    MOUTH SORES:  causes, oral care, ways to manage [x]    ALOPECIA:  cause, ways to manage, resources [x]    INFERTILITY & SEXUALITY:  causes, fertility preservation options, sexuality changes, ways to manage, importance of birth control [x]    NERVOUS SYSTEM CHANGES:  causes, s/s, neuropathies, cognitive changes, ways to manage [x] Discussed possible neuropathy   PAIN:  causes, ways to manage [x] ????   SKIN & NAIL CHANGES:  cause, s/s, ways to manage [x]    ORGAN TOXICITIES:  cause, s/s, need for diagnostic tests, labs, when to notify MD [x] Discussed immunotherapy toxicities with each system; pt was given ID card to carry in his wallet to alert other providers that his on Atezolizumab   SURVIVORSHIP:  distress, distress assessment, secondary malignancies, early/late effects, follow-up, social issues, social support [x]    HOME CARE:  use of spill kits, storing of PO chemo, how to manage bodily fluids [x]    MISCELLANEOUS:  drug interactions, administration, vesicant, et [x]        Assessment and Plan:    Diagnoses and all orders for this visit:    1. Lung mass        This was a 25 minute face-to-face visit with 25 minutes spent in  counseling and coordination of care as documented above.   The patient and I have reviewed their new cancer diagnosis and scheduled treatment plan. Needs assessment was completed including genetics, psychosocial needs, barriers to care, VAD evaluation, advanced care planning, and palliative care services. Referrals have been ordered as appropriate based upon our evaluation and patient desires.     Chemotherapy teaching was also completed today as documented  above. Adequate time was given to answer all questions to his satisfaction. Patient and family are aware of their care team members and contact information if they have questions or problems throughout the treatment course. Needs assessments and education has been completed. The patient is adequately prepared to begin treatment as scheduled.       Debora Hendrickson, APRN

## 2021-03-01 NOTE — PROGRESS NOTES
TWO PATIENT IDENTIFIERS WERE USED. CONSENT WAS SIGNED PER PATIENT EDUCATION MATERIAL WAS GIVEN TO PATIENT AND / OR FAMILY. THE PATIENT WAS DRAPED WITH FULL BODY DRAPE AND PATIENT'S RIGHT ARM WAS PREPPED WITH CHLORAPREP.  ULTRASOUND WAS USED TO LOCALIZE THERIGHT BASILIC  VEIN. SUBCUTANEOUS TISSUE AT THE CATHETER SITE WAS INFILTRATED WITH 2% LIDOCAINE. UNDER ULTRASOUND GUIDANCE, THE VEIN WAS ACCESSED WITH A 21GAUGE  NEEDLE. AN 0.018 WIRE WAS THEN THREADED THROUGH THE NEEDLE INTO THE CENTRAL VENOUS SYSTEM. THE 21GAUGE  NEEDLE WAS REMOVED AND A 5 Ukrainian PEEL AWAY SHEATH WAS PLACED OVER THE WIRE. THE PICC LINE CATHETER WAS CUT AT 38 CM. THE PICC LINE CATHETER WAS THEN PLACED OVER THE WIRE INTO THE VEIN, THE SHEATH WAS PEELED AWAY,WIRE WAS REMOVED. CATHETER WAS FLUSHED WITH NORMAL SALINE AND TIPS APPLIED. BIOPATCH PLACED. CATHETER SECURED WITH STATLOCK AND TEGADERM. PATIENT TOLERATED PROCEDURE WELL. THIS WAS DONE IN THE   ANGIOSUITE      IMPRESSION: SUCCESSFUL PLACEMENT OF SINGLE LUMEN SOLO PICC        Judith Gillette  3/1/2021  13:55 CST

## 2021-03-01 NOTE — PROGRESS NOTES
Jakob MCGARRY Abhinav was seen in follow up for lung mass, bone metastases and obstructive jaundice.   Patient had ERCP/EUS done and biopsy of AP node, which showed small cell cancer.   We discussed diagnosis, prognosis, treatment options length.   Pain regimen discussed at length.     Past Medical History, Past Surgical History, Social History, Family History have been reviewed and are without significant changes except as mentioned.        Medications:  The current medication list was reviewed in the EMR    ALLERGIES:    Allergies   Allergen Reactions   • Contrast Dye Hives   • Keflex [Cephalexin] Hives       Objective      Vitals:    03/01/21 1146   BP: 125/74   Pulse: 97   Resp: 18   Temp: 98.7 °F (37.1 °C)   SpO2: 97%       Current Status 2/3/2021   ECOG score 0       Physical Exam  Vitals signs and nursing note reviewed.   Constitutional:       Appearance: He is ill-appearing.   HENT:      Mouth/Throat:      Mouth: Mucous membranes are moist.      Pharynx: No oropharyngeal exudate.   Eyes:      Pupils: Pupils are equal, round, and reactive to light.   Cardiovascular:      Rate and Rhythm: Normal rate and regular rhythm.      Heart sounds: No murmur.   Pulmonary:      Effort: No respiratory distress.      Breath sounds: Normal breath sounds. No wheezing.   Abdominal:      General: There is no distension.      Palpations: Abdomen is soft. There is no mass.      Tenderness: There is no abdominal tenderness.   Musculoskeletal:         General: Tenderness present.      Right lower leg: No edema.      Left lower leg: No edema.   Skin:     General: Skin is dry.      Coloration: Skin is not jaundiced.   Neurological:      Mental Status: He is alert.               RECENT LABS:Independently reviewed and summarized  Hematology WBC   Date Value Ref Range Status   02/12/2021 6.84 3.40 - 10.80 10*3/mm3 Final   12/10/2018 7.7 4.0 - 11.0 10*3/uL Final     RBC   Date Value Ref Range Status   02/12/2021 4.39 4.14 -  5.80 10*6/mm3 Final   12/10/2018 4.81 4.73 - 5.49 10*6/uL Final     Hemoglobin   Date Value Ref Range Status   02/12/2021 14.2 13.0 - 17.7 g/dL Final   12/10/2018 15.2 14.4 - 16.6 g/dL Final     Hematocrit   Date Value Ref Range Status   02/12/2021 42.1 37.5 - 51.0 % Final   12/10/2018 46.2 42.9 - 49.1 % Final     Platelets   Date Value Ref Range Status   02/12/2021 145 140 - 450 10*3/mm3 Final   12/10/2018 171 150 - 450 10*3/uL Final            Lab Results   Component Value Date    GLUCOSE 152 (H) 02/12/2021    BUN 14 02/12/2021    CREATININE 0.78 02/12/2021    EGFRIFNONA 100 02/12/2021    BCR 17.9 02/12/2021    K 3.7 02/12/2021    CO2 28.0 02/12/2021    CALCIUM 9.3 02/12/2021    ALBUMIN 3.70 02/12/2021     (H) 02/12/2021     (H) 02/12/2021         Imaging (independently reviewed and summarized):      Result of CT chest with contrast from January 22, 2021 reviewed.  This showed 3.2 cm spiculated mass in left upper lobe abutting the pleural surface and measuring 3.2 cm in greatest diameter.     Nuclear medicine PET scan from January 29, 2021 reviewed.  This showed marked hypermetabolic activity associated with left hilar, perihilar, mediastinal mass with SUV max of 20.4.  It also showed multiple osseous metastatic disease in the ribs, right iliac bone, right acetabulum, right proximal femoral shaft.  It showed hypermetabolic focus of activity in left lobe of liver as well as right lobe of liver.  It showed cholelithiasis with markedly dilated cystic duct measuring 1.3 cm in diameter with a layering of stones in the cystic duct.    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    Leopoldo La reports a pain score of 7.  Given his pain assessment as noted, treatment options were discussed and the following options were decided upon as a follow-up plan to address  the patient's pain: prescription for opiod analgesics.      Patient screened positive for depression based on a PHQ-9 score of 0 on 3/1/2021. Follow-up recommendations include: Suicide Risk Assessment performed.      Diagnosis:   (1) Small cell lung cancer, metastatic   (2) Bone metastases  (3) Obstructive jaundice   (4) Cancer associated pain     Assessment/Plan       Diagnosis:   (1) Small cell lung cancer, metastatic (2) Bone metastases       Result of pathology reviewed. Showed small cell carcinoma.   Patient with likely extensive stage small cell lung cancer.    · Discussed diagnosis, prognosis and treatment options at length.   · Discussed that metastatic small cell lung cancer is incurable disease and goal of treatment is strictly palliative in nature.   · Alternative treatment options, including palliative care/comfort care discussed as well.     He is currently quite symptomatic from small cell lung cancer.   I recommend palliative chemotherapy with carboplatin/etoposide/tecentriq every 3 weeks.   Side effects associated with chemo-immunotherapy regimen discussed at length.   Patient understood and was agreeable to the plan.     I will arrange for PICC line placement for venous access.   Risks versus benefits discussed.   We will plan to start him ASAP.     Patient does need MRI brain, but he is unable to stay flat due to severe pain.     Referral to tertiary care center, but I believe in his current condition, he is too frail to make a trip. Encourage if his condition improved, he should consider this strongly.       (3) Obstructive jaundice   Likely secondary to metastatic cancer.   Patient was also noted to have multiple biliary stone.   He will follow up with GI and undergo stent placement.     (4) Cancer associated pain   Patient pain does appear somewhat out of proportion to extent of tumor.   He was on methadone by pain clinic and this was discontinued.   He is currently on oxycodone 15 mg every 4  hours as needed for pain.   He tells me that oxycodone unable to control pain for longer period of time.   I will add fentanyl 25 mcg to oxycodone to her pain regimen. New prescription sent the pharmacy.           3/1/2021      CC:

## 2021-03-04 PROBLEM — Z45.2 ENCOUNTER FOR VENOUS ACCESS DEVICE CARE: Status: ACTIVE | Noted: 2021-01-01

## 2021-03-10 PROBLEM — K52.1 CHEMOTHERAPY INDUCED DIARRHEA: Status: ACTIVE | Noted: 2021-01-01

## 2021-03-10 PROBLEM — T45.1X5A CHEMOTHERAPY INDUCED DIARRHEA: Status: ACTIVE | Noted: 2021-01-01

## 2021-03-10 NOTE — TELEPHONE ENCOUNTER
----- Message from Arianne Mckeon MD sent at 3/10/2021 11:06 AM CST -----  Regarding: RE: Stent Removal  Yes they can get it out   ----- Message -----  From: Randall Dahl RN  Sent: 3/10/2021  11:05 AM CST  To: Arianne Mckeon MD  Subject: Stent Removal                                    Patient said he forgot to mention to you that Dr. Gates is wanting to take stent out of his bile duct.   He is asking if you recommend this or if he should wait?

## 2021-03-10 NOTE — PROGRESS NOTES
"  Subjective     Leopoldo MCGARRY Abhinav was seen in follow-up for small cell lung cancer.  He received cycle 1 carboplatin, etoposide and Tecentriq last week.  Tolerated chemotherapy very well.  His obstructive jaundice is resolved.  Continue struggle with pain issues.    Patient with longstanding history of chronic pain now with bone metastasis.  I had a very lengthy discussion with patient about his pain regimen.  I discussed with patient that he should not be taking any more pain medication than it has been prescribed.  Patient was reporting severe pain and is taking 3 patches of fentanyl 25 mcg.  I will give him 75 mcg fentanyl patch to be used along with oxycodone as needed for breakthrough pain.  I did discuss with patient that if his pain does get better I will titrate the pain medication down.  In addition, I discussed with him that I would not be increasing the dose of any pain medication further unless his cancer shows progressive disease.  He understood and was agreeable to the plan.    Past Medical History, Past Surgical History, Social History, Family History have been reviewed and are without significant changes except as mentioned.        Medications:  The current medication list was reviewed in the EMR    ALLERGIES:    Allergies   Allergen Reactions   • Contrast Dye Hives   • Keflex [Cephalexin] Hives       Objective      Vitals:    03/10/21 1017   BP: 135/75   Pulse: 92   Resp: 18   Temp: 98 °F (36.7 °C)   TempSrc: Temporal   Weight: 62.1 kg (136 lb 12.8 oz)   Height: 177.8 cm (70\")   PainSc:   7   PainLoc: Generalized     Current Status 3/10/2021   ECOG score 0       Physical Exam  Vitals and nursing note reviewed.   Constitutional:       Appearance: Normal appearance. He is not ill-appearing.   HENT:      Nose: Nose normal. No congestion.      Mouth/Throat:      Mouth: Mucous membranes are moist.      Pharynx: No oropharyngeal exudate.   Cardiovascular:      Rate and Rhythm: Normal rate and regular " rhythm.      Heart sounds: No murmur.   Pulmonary:      Effort: Pulmonary effort is normal. No respiratory distress.      Breath sounds: Normal breath sounds. No wheezing.   Abdominal:      General: There is no distension.      Palpations: Abdomen is soft. There is no mass.      Tenderness: There is no abdominal tenderness.   Neurological:      Mental Status: He is alert.   Psychiatric:         Mood and Affect: Mood normal.         Behavior: Behavior normal.         Thought Content: Thought content normal.               RECENT LABS:Independently reviewed and summarized  Hematology WBC   Date Value Ref Range Status   03/10/2021 6.31 3.40 - 10.80 10*3/mm3 Final   12/10/2018 7.7 4.0 - 11.0 10*3/uL Final     RBC   Date Value Ref Range Status   03/10/2021 4.46 4.14 - 5.80 10*6/mm3 Final   12/10/2018 4.81 4.73 - 5.49 10*6/uL Final     Hemoglobin   Date Value Ref Range Status   03/10/2021 14.3 13.0 - 17.7 g/dL Final   12/10/2018 15.2 14.4 - 16.6 g/dL Final     Hematocrit   Date Value Ref Range Status   03/10/2021 39.4 37.5 - 51.0 % Final   12/10/2018 46.2 42.9 - 49.1 % Final     Platelets   Date Value Ref Range Status   03/10/2021 147 140 - 450 10*3/mm3 Final   12/10/2018 171 150 - 450 10*3/uL Final       Lab Results   Component Value Date    GLUCOSE 152 (H) 03/10/2021    BUN 11 03/10/2021    CREATININE 0.71 (L) 03/10/2021    EGFRIFNONA 112 03/10/2021    BCR 15.5 03/10/2021    K 3.3 (L) 03/10/2021    CO2 28.0 03/10/2021    CALCIUM 9.3 03/10/2021    ALBUMIN 3.60 03/10/2021    AST 33 03/10/2021    ALT 26 03/10/2021          Leopoldo La reports a pain score of 7.  Given his pain assessment as noted, treatment options were discussed and the following options were decided upon as a follow-up plan to address the patient's pain: prescription for opiod analgesics.    Patient screened positive for depression based on a PHQ-9 score of 0 on 3/10/2021. Follow-up recommendations include: Suicide Risk Assessment  performed.        Diagnosis:   (1) Small cell lung cancer, metastatic   (2) Bone metastases  (3) Obstructive jaundice   (4) Cancer associated pain   (5) Diarrhea     Assessment/Plan     (1) Small cell lung cancer, metastatic (2) Bone metastases    Patient underwent cycle 1 carboplatin, etoposide and Tecentriq last week.  Tolerated chemotherapy well.  Mild chemotherapy-induced nausea which responded to antinausea medication.  He has been reporting diarrhea which is likely secondary to chemotherapy (see management below).  He has not any fever or chills.    Plan to see him back in 2 weeks with CBC, CMP, TSH and cycle 2 carboplatin, etoposide and Tecentriq.  He will need MRI brain once his pain overall improves as he is unable to lay flat on hard surface for long time currently.    (3) Obstructive jaundice  This is improved after stent placement.  Follows with GI.     (4) Cancer associated pain   Patient with longstanding history of chronic pain now with bone metastasis.  I had a very lengthy discussion with patient about his pain regimen.  I discussed with patient that he should not be taking any more pain medication than it has been prescribed.  Patient was reporting severe pain and is taking 3 patches of fentanyl 25 mcg.  I will give him 75 mcg fentanyl patch to be used along with oxycodone as needed for breakthrough pain.  I did discuss with patient that if his pain does get better I will titrate the pain medication down.  In addition, I discussed with him that I would not be increasing the dose of any pain medication further unless his cancer shows progressive disease.  He understood and was agreeable to the plan.    (5) Diarrhea   Suspect this is chemotherapy related.  Nonbloody.  No abdominal pain.  No fever or chills.  He has persistent diarrhea on Imodium.  I will send prescription of Lomotil to his pharmacy today to assist with diarrhea      3/10/2021      CC:

## 2021-03-24 PROBLEM — Z79.899 OTHER LONG TERM (CURRENT) DRUG THERAPY: Status: ACTIVE | Noted: 2021-01-01

## 2021-03-24 NOTE — PATIENT INSTRUCTIONS
INSTRUCTIONS FOR YOUR PET/CT EXAM AT [facility]    Report to Same Day Surgery the day of your appointment for your PET/CT scan.    DO NOT EAT, DRINK, SMOKE (THIS INCLUDES E-CIGARETTES), DIP OR CHEW AFTER MIDNIGHT THE MORNING OF YOUR EXAM.    EXCEPT, please drink 3-4 glasses of PLAIN tap water before your arrival at Same Day Surgery.  You may drink two (2) - 20 ounce bottles of unflavored bottled water before your arrival in place of the tap water.     You may take your morning medication with plain water EXCEPT YOUR DIABETIC MEDICATION.  DO NOT TAKE ANY DIABETIC MEDICATIONS THAT MORNING.    On Sunday, please eat High Protein/Low Carbohydrate diet.  For example: Protein: Chicken, Beef, Pork, Cheese, or Turkey.  You may have as much of these as you like.  Please limit Carbohydrates: Sodas, breads, pastas, gravy, cereals, chips, cakes, and cookies.    Please wear comfortable clothing.  Due to imaging, please do not wear overalls, coveralls or any clothing with metal on the shirt.    The test will take approximately 2 to 2 1/2 hours.  Upon arrival in the Radiology Department, you will be asked to fill out a questionnaire, the technologist will come and get you and escort you to the mobile PET unit.  The technologist will start an IV on you and check your blood sugar and if your blood sugar is within sufficient range do the exam, you will receive an injection of the liquid radiation.  The injection will not make you feel any different than you feel now.  It will not make you feel good but it will not make you feel bad either.  The injection must circulate for 45 minutes to an hour.  You will then lie on an imaging table and be scanned from your head to mid-thigh.  Images will take approximately 30 minutes.    A technologist will call you on the Friday before your exam to go over your instructions.  Please make sure your physician has a good phone number to contact you.  If you do not hear from [facility]  [facility] Department by 2 p.m. On the Friday before your exam, please call [phone] or [phone].    If you need to reschedule your exam, please call the above phone numbers to reschedule your PET/CT exam.    YOU ARE BEING SCHEDULED FOR A PET/CT SCAN.  YOU SHOULD RECEIVE A PHONE CALL WITH YOUR APPOINTMENT WITHIN 3 DAYS.  IF YOU DO NOT RECEIVE A PHONE CALL WITH YOU APPOINTMENT, P[facility] [phone].  THANK YOU!    Randall Dahl RN  March 24, 2021  09:04 CDT    Magnetic Resonance Imaging  Magnetic resonance imaging (MRI) is a painless test that takes pictures of the inside of your body. This test uses a strong magnet. This test does not use X-rays.  What happens before the procedure?  · You will be asked about any metal you may have in your body. This includes:  ? Any joint replacement (prosthesis), such as an artificial knee or hip.  ? Any implanted devices or ports.  ? A metallic ear implant (cochlear implant).  ? An artificial heart valve.  ? A metallic object in the eye.  ? Metal splinters.  ? Bullet fragments.  ? Any tattoos. Some of the darker inks can cause problems with testing.  · You will be asked to take off all metal. This includes:  ? Your watch, jewelry, and other metal objects.  ? Hearing aids.  ? Dentures.  ? Underwire bra.  ? Makeup. Some kinds of makeup contain small amounts of metal.  ? Braces and fillings normally are not a problem.  · If you are breastfeeding, ask your doctor if you need to pump before your test and then stop breastfeeding for a short time. You may need to do this if dye (contrast material) will be used during your MRI.  · If you have a fear of cramped spaces (claustrophobia), you may be given medicines prior to the MRI.  What happens during the procedure?    · You may be given earplugs or headphones to listen to music. The MRI machine can be noisy.  · You will lie down on a platform. It looks like a long table.  · If  dye will be used, an IV tube will be placed into one of your veins. Dye will be given through your IV tube at a certain time as images are taken.  · The platform will slide into a tunnel. The tunnel has magnets inside of it. When you are inside the tunnel, you will still be able to talk to your doctor.  · The tunnel will scan your body and make images. You will be asked to lie very still. Your doctor will tell you when you can move. You may have to wait a few minutes to make sure that the images from the test are clear.  · When all images are taken, the platform will slide out of the tunnel.  The procedure may vary among doctors and hospitals.  What happens after the procedure?  · You may be taken to a recovery area if sedation medicines were used. Your blood pressure, heart rate, breathing rate, and blood oxygen level will be monitored until you leave the hospital or clinic.  · If dye was used:  ? It will leave your body through your pee (urine). It takes about a day for all of the dye to leave the body.  ? You may be told to drink plenty of fluids. This helps your body get rid of the dye.  ? If you are breastfeeding, do not breastfeed your child until your doctor says that this is safe.  · You may go back to your normal activities right away, or as told by your doctor.  · It is up to you to get your test results. Ask your doctor, or the department that is doing the test, when your results will be ready.  Summary  · Magnetic resonance imaging (MRI) is a test that takes pictures of the inside of your body.  · Before your MRI, be sure to tell your doctor about any metal you may have in your body.  · You may go back to your normal activities right away, or as told by your doctor.  This information is not intended to replace advice given to you by your health care provider. Make sure you discuss any questions you have with your health care provider.  Document Revised: 11/12/2018 Document Reviewed: 11/12/2018  [employer]  Patient Education © 2021 [employer]

## 2021-03-24 NOTE — PROGRESS NOTES
"  Subjective     Leopoldo La was seen in follow up for small cell lung cancer.   He received cycle 1 carboplatin, etoposide and Tecentriq 3 weeks ago.  Tolerated treatment well except for mild chemotherapy-induced nausea, chemotherapy-induced diarrhea and persistent back pain.  His nausea responded to antinausea medication.  Diarrhea is responding to Imodium and Lomotil as needed.  Counseled about brat diet.  He will need MRI brain for staging purposes.  I will arrange this today.  His overall pain is been stable.  Discussed with patient that his chronic back pain is which has been struggling over 20 years would unlikely to get better but overall his pain is stable indicating likely stabilization of cancer.  He is requesting new refill for oxycodone which was arranged today.  Labs reviewed.  Cycle 2 carboplatin + Tecentriq signed.  I will obtain PET scan in 3 weeks to evaluate response to treatment      Past Medical History, Past Surgical History, Social History, Family History have been reviewed and are without significant changes except as mentioned.      Medications:  The current medication list was reviewed in the EMR    ALLERGIES:    Allergies   Allergen Reactions   • Contrast Dye Hives   • Keflex [Cephalexin] Hives       Objective      Vitals:    03/24/21 0843   BP: 131/75   Pulse: 80   Resp: 18   Temp: 98.5 °F (36.9 °C)   TempSrc: Temporal   Weight: 62.1 kg (137 lb)   Height: 177.8 cm (70\")   PainSc:   8   PainLoc: Back     Current Status 3/24/2021   ECOG score 0       Physical Exam  Vitals and nursing note reviewed.   HENT:      Head: Normocephalic and atraumatic.   Eyes:      General: No scleral icterus.     Pupils: Pupils are equal, round, and reactive to light.   Cardiovascular:      Rate and Rhythm: Normal rate and regular rhythm.      Heart sounds: No murmur heard.     Pulmonary:      Effort: Pulmonary effort is normal.      Breath sounds: Normal breath sounds.   Abdominal:      General: Abdomen is " flat. There is no distension.      Palpations: Abdomen is soft. There is no mass.      Tenderness: There is no abdominal tenderness.   Neurological:      Mental Status: He is alert.   Psychiatric:         Behavior: Behavior normal.         Thought Content: Thought content normal.           RECENT LABS:Independently reviewed and summarized  Hematology WBC   Date Value Ref Range Status   03/24/2021 5.83 3.40 - 10.80 10*3/mm3 Final   12/10/2018 7.7 4.0 - 11.0 10*3/uL Final     RBC   Date Value Ref Range Status   03/24/2021 4.16 4.14 - 5.80 10*6/mm3 Final   12/10/2018 4.81 4.73 - 5.49 10*6/uL Final     Hemoglobin   Date Value Ref Range Status   03/24/2021 13.1 13.0 - 17.7 g/dL Final   12/10/2018 15.2 14.4 - 16.6 g/dL Final     Hematocrit   Date Value Ref Range Status   03/24/2021 37.0 (L) 37.5 - 51.0 % Final   12/10/2018 46.2 42.9 - 49.1 % Final     Platelets   Date Value Ref Range Status   03/24/2021 271 140 - 450 10*3/mm3 Final   12/10/2018 171 150 - 450 10*3/uL Final     Lab Results   Component Value Date    GLUCOSE 121 (H) 03/24/2021    BUN 8 03/24/2021    CREATININE 0.61 (L) 03/24/2021    EGFRIFNONA 133 03/24/2021    BCR 13.1 03/24/2021    K 3.5 03/24/2021    CO2 29.0 03/24/2021    CALCIUM 9.5 03/24/2021    ALBUMIN 3.30 (L) 03/24/2021    AST 22 03/24/2021    ALT 16 03/24/2021            Leopoldo La reports a pain score of 8.  Given his pain assessment as noted, treatment options were discussed and the following options were decided upon as a follow-up plan to address the patient's pain: prescription for opiod analgesics.    Patient screened positive for depression based on a PHQ-9 score of 0 on 3/24/2021. Follow-up recommendations include: Suicide Risk Assessment performed.      Diagnosis:   (1) Small cell lung cancer, metastatic   (2) Bone metastases  (3) Obstructive jaundice   (4) Cancer associated pain   (5) Diarrhea     Assessment/Plan     (1) Small cell lung cancer, metastatic (2) Bone metastases    He  received cycle 1 carboplatin, etoposide and Tecentriq 3 weeks ago.  Tolerated treatment well except for mild chemotherapy-induced nausea, chemotherapy-induced diarrhea and persistent back pain.  His nausea responded to antinausea medication.  Diarrhea is responding to Imodium and Lomotil as needed.  Counseled about brat diet.  He will need MRI brain for staging purposes.  I will arrange this today.  His overall pain is been stable.  Labs reviewed.  Cycle 2 carboplatin + Tecentriq signed.  I will obtain PET scan in 3 weeks to evaluate response to treatment    (3) Obstructive jaundice   This is improved after stent placement.  Secondary to malignancy.  No recurrence.  Closely monitor    (4) Cancer associated pain   Chronic, stable.  I believe some of his back pain is not related to cancer as he is struggle with chronic back pain for over 20 years.  Discussed with patient that I would not increase his pain medication unless we see evidence of cancer progression.  Continue fentanyl 75 mcg  And oxycodone 50 mg every 4 hours as needed for breakthrough pain.  New prescription of oxycodone was sent to the pharmacy.  He is requesting prescription of Soma.  I discussed with patient that I do not prescribe Soma.      (5) Diarrhea   Chronic, stable.  Secondary to chemotherapy.  Recommend Imodium and Lomotil as needed.  Recommend brat diet.    On today's visit:   -I signed his chemotherapy with carboplatin, etoposide and Tecentriq  -I order MRI brain for staging.  -I ordered PET scan in 3 weeks to evaluate response to treatment  -I prescribed him oxycodone refill.  -I ordered CBC, CMP and TSH prior to his next visit      3/24/2021      CC:

## 2021-03-25 NOTE — PROGRESS NOTES
Adult Outpatient Nutrition  Assessment    Patient Name:  Leopoldo La  YOB: 1955  MRN: [id number]    Assessment Date:  Entry from 3/24/21    Comments: Follow-up visit to check nutrition. Wt 137 lb (up). Alb 3.3. Has felt slightly better since bile duct stent placed. Nausea less. Diarrhea significant--takes imodium x4 daily). Flatus with pain continues. Pt fears Boost/Ensure is making diarrhea worse. Has not tried homemade shake recipe that was mailed. Provided additional copy of recipe with instructions. Pt looks forward to trying.  Reinforced importance of hydration.                        Electronically signed by:  Lala Valenzuela RD  03/25/21 09:48 CDT

## 2021-03-31 NOTE — TELEPHONE ENCOUNTER
Sent.  In the future patient probably should send pain medication request at least in 1 day advance just in case.

## 2021-03-31 NOTE — TELEPHONE ENCOUNTER
Called and spoke with pt regarding pain medication being sent to pharmacy to be picked up.  V/u obtained.

## 2021-04-10 NOTE — ED PROVIDER NOTES
Subjective   Patient presents with wife with complaints of abdominal pain, chest pain, fever, vomiting.  Patient has small cell lung cancer and last had chemo a week and a half ago.  Wife endorses confusion with some loss of balance over the past couple of days.  His last PET scan was the end of January 1 February which showed liver mets as well as bone mets to the vertebrae and the hip.          Review of Systems   Constitutional: Positive for appetite change, chills and fever. Negative for activity change.   HENT: Negative for congestion and ear pain.    Eyes: Negative for pain and discharge.   Respiratory: Negative for chest tightness and shortness of breath.    Cardiovascular: Positive for chest pain. Negative for palpitations.   Gastrointestinal: Positive for abdominal pain, nausea and vomiting. Negative for abdominal distention.   Endocrine: Negative for cold intolerance and heat intolerance.   Genitourinary: Negative for difficulty urinating and dysuria.   Musculoskeletal: Negative for arthralgias and back pain.   Skin: Negative for color change and rash.   Allergic/Immunologic: Positive for immunocompromised state. Negative for environmental allergies and food allergies.   Neurological: Positive for dizziness. Negative for headaches.   Hematological: Negative for adenopathy. Does not bruise/bleed easily.   Psychiatric/Behavioral: Positive for confusion. Negative for agitation.       Past Medical History:   Diagnosis Date   • COPD (chronic obstructive pulmonary disease) (CMS/HCC)    • Lung mass        Allergies   Allergen Reactions   • Contrast Dye Hives   • Keflex [Cephalexin] Hives       Past Surgical History:   Procedure Laterality Date   • HERNIA REPAIR     • LUMBAR FUSION         Family History   Problem Relation Age of Onset   • Hypertension Father        Social History     Socioeconomic History   • Marital status:      Spouse name: Not on file   • Number of children: Not on file   • Years of  education: Not on file   • Highest education level: Not on file   Tobacco Use   • Smoking status: Former Smoker     Packs/day: 0.25     Years: 20.00     Pack years: 5.00     Types: Cigarettes     Quit date: 2021     Years since quittin.2   • Smokeless tobacco: Never Used   Substance and Sexual Activity   • Alcohol use: Never   • Drug use: Never   • Sexual activity: Not Currently           Objective   Physical Exam  Vitals and nursing note reviewed.   Constitutional:       Appearance: He is well-developed.   HENT:      Head: Normocephalic and atraumatic.   Eyes:      Extraocular Movements: Extraocular movements intact.      Pupils: Pupils are equal, round, and reactive to light.   Neck:      Thyroid: No thyromegaly.      Trachea: No tracheal deviation.   Cardiovascular:      Rate and Rhythm: Normal rate.      Pulses:           Radial pulses are 2+ on the left side.        Dorsalis pedis pulses are 2+ on the right side and 2+ on the left side.      Heart sounds: Normal heart sounds, S1 normal and S2 normal.   Pulmonary:      Effort: Pulmonary effort is normal.      Breath sounds: Normal breath sounds.   Abdominal:      General: Bowel sounds are normal.      Palpations: Abdomen is soft.      Tenderness: There is abdominal tenderness (left upper and epigastric).   Musculoskeletal:         General: Normal range of motion.      Cervical back: Neck supple.   Skin:     General: Skin is warm and dry.      Capillary Refill: Capillary refill takes 2 to 3 seconds.   Neurological:      General: No focal deficit present.      Mental Status: He is alert and oriented to person, place, and time.      GCS: GCS eye subscore is 4. GCS verbal subscore is 5. GCS motor subscore is 6.      Cranial Nerves: Cranial nerves are intact.      Sensory: Sensation is intact.      Motor: Motor function is intact.      Coordination: Coordination is intact.      Gait: Gait is intact.   Psychiatric:         Mood and Affect: Mood is anxious.          Speech: Speech normal.         Behavior: Behavior normal.         Thought Content: Thought content normal.         Procedures           ED Course                                 CT Abdomen Pelvis Without Contrast    Result Date: 4/10/2021  1.  Findings suggest acute cholecystitis. Significant inflammation of the gallbladder and within the right upper quadrant is noted. 2.  Moderate pneumobilia with associated biliary dilatation. A common bile duct stent is in place. Electronically signed by:  Negrita Frank MD  4/10/2021 12:08 AM CDT Workstation: [id number]    CT Head Without Contrast    Result Date: 4/9/2021    No acute intracranial findings. Electronically signed by:  Negrita Frank MD  4/9/2021 11:46 PM CDT Workstation: [email or web address]    XR Chest 1 View    Result Date: 4/9/2021    No change in the left upper lobe and left hilar masses. Electronically signed by:  Negrita Frank MD  4/9/2021 11:44 PM CDT Workstation: [email or web address]    Lab Results (last 24 hours)     Procedure Component Value Units Date/Time    Troponin [[id number]]  (Normal) Collected: 04/10/21 0116    Specimen: Blood Updated: 04/10/21 0148     Troponin T <0.010 ng/mL     Narrative:      Troponin T Reference Range:  <= 0.03 ng/mL-   Negative for AMI  >0.03 ng/mL-     Abnormal for myocardial necrosis.  Clinicians would have to utilize clinical acumen, EKG, Troponin and serial changes to determine if it is an Acute Myocardial Infarction or myocardial injury due to an underlying chronic condition.       Results may be falsely decreased if patient taking Biotin.      [facility] Draw [[id number]] Collected: 04/09/21 2319    Specimen: Blood Updated: 04/10/21 0031    Narrative:      The following orders were created for panel order [facility] Draw.  Procedure                               Abnormality         Status                     ---------                               -----------         ------                     Light Blue Top[[id number]]                                    Final result               Green Top (Gel)[[id number]]                                  Final result               Lavender Top[[id number]]                                     Final result               Gold Top - SST[[id number]]                                   Final result                 Please view results for these tests on the individual orders.    Light Blue Top [[id number]] Collected: 04/09/21 2319    Specimen: Blood Updated: 04/10/21 0031     Extra Tube hold for add-on     Comment: Auto resulted       Lavender Top [[id number]] Collected: 04/09/21 2319    Specimen: Blood Updated: 04/10/21 0031     Extra Tube hold for add-on     Comment: Auto resulted       Gold Top - SST [[id number]] Collected: 04/09/21 2319    Specimen: Blood Updated: 04/10/21 0031     Extra Tube Hold for add-ons.     Comment: Auto resulted.       Green Top (Gel) [[id number]] Collected: 04/09/21 2319    Specimen: Blood Updated: 04/10/21 0031     Extra Tube Hold for add-ons.     Comment: Auto resulted.       Comprehensive Metabolic Panel [[id number]]  (Abnormal) Collected: 04/09/21 2319    Specimen: Blood Updated: 04/09/21 2341     Glucose 109 mg/dL      BUN 9 mg/dL      Creatinine 0.58 mg/dL      Sodium 135 mmol/L      Potassium 3.4 mmol/L      Chloride 96 mmol/L      CO2 29.0 mmol/L      Calcium 9.0 mg/dL      Total Protein 6.6 g/dL      Albumin 3.00 g/dL      ALT (SGPT) 25 U/L      AST (SGOT) 19 U/L      Alkaline Phosphatase 239 U/L      Total Bilirubin 0.7 mg/dL      eGFR Non African Amer 141 mL/min/1.73      Globulin 3.6 gm/dL      A/G Ratio 0.8 g/dL      BUN/Creatinine Ratio 15.5     Anion Gap 10.0 mmol/L     Narrative:      GFR Normal >60  Chronic Kidney Disease <60  Kidney Failure <15      Troponin [[id number]]  (Normal) Collected: 04/09/21 2319    Specimen: Blood Updated: 04/09/21 2341     Troponin T <0.010 ng/mL     Narrative:      Troponin T Reference Range:  <= 0.03 ng/mL-   Negative for AMI  >0.03 ng/mL-     Abnormal for  myocardial necrosis.  Clinicians would have to utilize clinical acumen, EKG, Troponin and serial changes to determine if it is an Acute Myocardial Infarction or myocardial injury due to an underlying chronic condition.       Results may be falsely decreased if patient taking Biotin.      Lipase [[id number]]  (Abnormal) Collected: 04/09/21 2319    Specimen: Blood Updated: 04/09/21 2341     Lipase 7 U/L     BNP [[id number]]  (Abnormal) Collected: 04/09/21 2319    Specimen: Blood Updated: 04/09/21 2339     proBNP 3,487.0 pg/mL     Narrative:      Among patients with dyspnea, NT-proBNP is highly sensitive for the detection of acute congestive heart failure. In addition NT-proBNP of <300 pg/ml effectively rules out acute congestive heart failure with 99% negative predictive value.    Results may be falsely decreased if patient taking Biotin.      CBC & Differential [[id number]]  (Abnormal) Collected: 04/09/21 2319    Specimen: Blood Updated: 04/09/21 2327    Narrative:      The following orders were created for panel order CBC & Differential.  Procedure                               Abnormality         Status                     ---------                               -----------         ------                     CBC Auto Differential[[id number]]        Abnormal            Final result                 Please view results for these tests on the individual orders.    CBC Auto Differential [[id number]]  (Abnormal) Collected: 04/09/21 2319    Specimen: Blood Updated: 04/09/21 2327     WBC 10.72 10*3/mm3      RBC 3.66 10*6/mm3      Hemoglobin 11.2 g/dL      Hematocrit 32.3 %      MCV 88.3 fL      MCH 30.6 pg      MCHC 34.7 g/dL      RDW 13.2 %      RDW-SD 40.3 fl      MPV 10.9 fL      Platelets 112 10*3/mm3      Neutrophil % 61.3 %      Lymphocyte % 19.8 %      Monocyte % 17.1 %      Eosinophil % 0.2 %      Basophil % 0.5 %      Immature Grans % 1.1 %      Neutrophils, Absolute 6.58 10*3/mm3      Lymphocytes, Absolute 2.12  10*3/mm3      Monocytes, Absolute 1.83 10*3/mm3      Eosinophils, Absolute 0.02 10*3/mm3      Basophils, Absolute 0.05 10*3/mm3      Immature Grans, Absolute 0.12 10*3/mm3      nRBC 0.0 /100 WBC                   MDM  Number of Diagnoses or Management Options  Cholecystitis, chronic: new and requires workup  Confusion, hx of, without neuro findings: new and requires workup  Fever, unspecified fever cause: new and requires workup  Left upper quadrant abdominal pain: new and requires workup  Non-intractable vomiting with nausea, unspecified vomiting type: new and requires workup  Diagnosis management comments: CT head negative for acute findings.  Patient to have scheduled MRI of the head for further evaluation.  CT abdomen positive for chronic cholecystitis.  Labs were not significant for need for emergent cholecystectomy.  Wife states they have been watching this and he is not a candidate for surgery.  He follows up through [place] with GI.  Patient was feeling less nauseous and his fever resolved during his stay.  Wife and patient wished to go home.  Patient to follow-up with his PCP, GI physician in [place], and his oncologist.         Amount and/or Complexity of Data Reviewed  Clinical lab tests: ordered and reviewed  Tests in the radiology section of CPT®: ordered and reviewed  Decide to obtain previous medical records or to obtain history from someone other than the patient: yes    Risk of Complications, Morbidity, and/or Mortality  Presenting problems: moderate  Diagnostic procedures: low  Management options: low    Patient Progress  Patient progress: stable      Final diagnoses:   Left upper quadrant abdominal pain   Confusion, hx of, without neuro findings   Fever, unspecified fever cause   Non-intractable vomiting with nausea, unspecified vomiting type   Cholecystitis, chronic       ED Disposition  ED Disposition     ED Disposition Condition Comment    Discharge Stable           Conchis Barkley,  APRN  [address] [id number]  [phone]    Call in 1 day  for follow up         Medication List      Changed    * prochlorperazine 10 MG tablet  Commonly known as: COMPAZINE  Take 1 tablet by mouth Every 6 (Six) Hours As Needed for Nausea or Vomiting.  What changed: Another medication with the same name was added. Make sure you understand how and when to take each.     * prochlorperazine 5 MG tablet  Commonly known as: COMPAZINE  Take 1 tablet by mouth Every 6 (Six) Hours As Needed for Nausea or Vomiting.  What changed: You were already taking a medication with the same name, and this prescription was added. Make sure you understand how and when to take each.         * This list has 2 medication(s) that are the same as other medications prescribed for you. Read the directions carefully, and ask your doctor or other care provider to review them with you.               Where to Get Your Medications      These medications were sent to [facility] [address] [place] [address] - [phone]  - [phone] [address] [address]    Phone: [phone]   · prochlorperazine 5 MG tablet       This document has been electronically signed by Sarah Tenorio MD on April 11, 2021 00:09 CDT    Sarah Tenorio MD PGY-2  Part of this note may be an electronic transcription/translation of spoken language to printed text using the Dragon Dictation System.        Sarah Tenorio MD  Resident  04/11/21 0010

## 2021-04-10 NOTE — ED NOTES
Pt presented to the er to room 15 c/c epigastric pain and chest pain. Pt states he is in the late stages of cancer and has been receiving chemo. Pt states he has pain in his abdominal area and upper chest area both R and L side. No BP or Stick on right side. Pt has port for chemo. Port was not accessed. 20 G IV started in L AC.      Lucinda Chaudhary, RN  04/09/21 7425

## 2021-04-10 NOTE — DISCHARGE INSTRUCTIONS
Follow-up with your PCP and your GI doctor in [place].  CT scan today showed Leopoldo has cholecystitis.  His labs do not show he has active infection.  Return to ED should he develop shortness of breath, nausea/vomiting, chest pain, or any other concerning symptoms.

## 2021-04-13 PROBLEM — K81.9 CHOLECYSTITIS: Status: ACTIVE | Noted: 2021-01-01

## 2021-04-13 NOTE — PROGRESS NOTES
CHIEF COMPLAINT:    Assess for possible gallbladder surgery    HISTORY OF PRESENT ILLNESS:    Leopoldo La is a 65 y.o. 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  His last dose of chemotherapy was approximately 2-1/2 to 3 weeks ago.  The patient has a prior history of obstructive jaundice and has undergone an ERCP with stent placement in [place].  He was recently seen in the emergency department for right upper quadrant abdominal pain.  CT scan was performed that time showing apparent cholecystitis with his biliary stent in place and appropriate pneumobilia.  He is referred today to discuss possible cholecystectomy.  Currently he reports only minimal abdominal pain.  He states he has been able to eat and drink at home.    Past Medical History:   Diagnosis Date   • COPD (chronic obstructive pulmonary disease) (CMS/HCC)    • Lung mass        Past Surgical History:   Procedure Laterality Date   • HERNIA REPAIR     • LUMBAR FUSION         Prior to Admission medications    Medication Sig Start Date End Date Taking? Authorizing Provider   albuterol sulfate  (90 Base) MCG/ACT inhaler Inhale 1 puff. 1/21/21  Yes ProviderGlenda MD   butalbital-acetaminophen-caffeine (FIORICET, ESGIC) -40 MG per tablet Take 1 tablet by mouth Every 4 (Four) Hours As Needed for Headache.   Yes Provider, MD Glenda   diphenoxylate-atropine (LOMOTIL) 2.5-0.025 MG per tablet Take 1 tablet by mouth 4 (Four) Times a Day As Needed for Diarrhea. 3/25/21  Yes Arianne Mckeon MD   fentaNYL (DURAGESIC) 75 MCG/HR patch Place 1 patch on the skin as directed by provider Every 72 (Seventy-Two) Hours. 4/13/21  Yes Arianne Mckeon MD   methocarbamol (ROBAXIN) 750 MG tablet Take 1 tablet by mouth 3 (Three) Times a Day. 4/1/21  Yes Arianne Mckeon MD   ondansetron (ZOFRAN) 8 MG tablet Take 1 tablet by mouth 3 (Three) Times a Day As  Needed for Nausea or Vomiting. 3/1/21  Yes Arianne Mckeon MD   oxyCODONE (ROXICODONE) 15 MG immediate release tablet Take 1 tablet by mouth Every 4 (Four) Hours As Needed for Moderate Pain  or Severe Pain  for up to 14 days. 21 Yes Arianne Mckeon MD   pregabalin (LYRICA) 100 MG capsule 100 mg 2 (Two) Times a Day. 21  Yes Glenda Rothman MD   prochlorperazine (COMPAZINE) 10 MG tablet Take 1 tablet by mouth Every 6 (Six) Hours As Needed for Nausea or Vomiting. 3/1/21  Yes Arianne Mckeon MD   prochlorperazine (COMPAZINE) 5 MG tablet Take 1 tablet by mouth Every 6 (Six) Hours As Needed for Nausea or Vomiting. 4/10/21  Yes Sarah Tenorio MD   tamsulosin (FLOMAX) 0.4 MG capsule 24 hr capsule Take 1 capsule by mouth Daily.   Yes ProviderGlenda MD   fentaNYL (DURAGESIC) 75 MCG/HR patch Place 1 patch on the skin as directed by provider Every 72 (Seventy-Two) Hours. 3/10/21 4/13/21  Arianne Mckoen MD   oxyCODONE (ROXICODONE) 15 MG immediate release tablet Take 1 tablet by mouth Every 4 (Four) Hours As Needed for Moderate Pain  or Severe Pain  for up to 14 days. 3/31/21 4/13/21  Arianne Mckeon MD       Allergies   Allergen Reactions   • Contrast Dye Hives   • Keflex [Cephalexin] Hives       Family History   Problem Relation Age of Onset   • Hypertension Father        Social History     Socioeconomic History   • Marital status:      Spouse name: Not on file   • Number of children: Not on file   • Years of education: Not on file   • Highest education level: Not on file   Tobacco Use   • Smoking status: Former Smoker     Packs/day: 0.25     Years: 20.00     Pack years: 5.00     Types: Cigarettes     Quit date: 2021     Years since quittin.2   • Smokeless tobacco: Never Used   Substance and Sexual Activity   • Alcohol use: Never   • Drug use: Never   • Sexual activity: Not Currently       Review of Systems   Constitutional:  "Positive for unexpected weight change. Negative for activity change, appetite change, chills and fever.   HENT: Negative for hearing loss, nosebleeds and trouble swallowing.    Respiratory: Positive for shortness of breath.    Cardiovascular: Negative for chest pain, palpitations and leg swelling.   Gastrointestinal: Positive for abdominal pain and nausea. Negative for abdominal distention, anal bleeding, blood in stool, constipation, diarrhea, rectal pain and vomiting.   Endocrine: Negative for cold intolerance, heat intolerance, polydipsia and polyuria.   Genitourinary: Positive for difficulty urinating. Negative for decreased urine volume, dysuria, enuresis, frequency, hematuria and urgency.   Musculoskeletal: Positive for arthralgias, back pain and myalgias. Negative for gait problem and neck pain.   Skin: Negative for pallor, rash and wound.   Allergic/Immunologic: Negative for immunocompromised state.   Neurological: Positive for weakness and headaches. Negative for dizziness, seizures, light-headedness and numbness.   Psychiatric/Behavioral: Negative for agitation and behavioral problems. The patient is not nervous/anxious.        Objective     /70   Pulse 92   Temp 98.7 °F (37.1 °C) (Oral)   Ht 177.8 cm (70\")   Wt 63 kg (139 lb)   BMI 19.94 kg/m²     Physical Exam  Constitutional:       General: He is not in acute distress.     Appearance: Normal appearance. He is ill-appearing. He is not toxic-appearing or diaphoretic.   HENT:      Head: Normocephalic and atraumatic.   Eyes:      General: No scleral icterus.        Right eye: No discharge.         Left eye: No discharge.      Extraocular Movements: Extraocular movements intact.      Conjunctiva/sclera: Conjunctivae normal.   Cardiovascular:      Rate and Rhythm: Normal rate.   Pulmonary:      Effort: Pulmonary effort is normal. No respiratory distress.   Abdominal:      General: There is no distension.      Palpations: Abdomen is soft. There is " no mass.      Tenderness: There is no abdominal tenderness. There is no guarding or rebound.      Hernia: No hernia is present.   Skin:     General: Skin is warm.   Neurological:      General: No focal deficit present.      Mental Status: He is alert and oriented to person, place, and time.   Psychiatric:         Mood and Affect: Mood normal.         Behavior: Behavior normal.         Thought Content: Thought content normal.         Judgment: Judgment normal.         DIAGNOSTIC DATA:    CT images and report from 4/9/2021 reviewed showing apparent cholecystitis    ASSESSMENT:    Cholelithiasis with cholecystitis and patient with metastatic small cell lung cancer    PLAN:    I discussed the patient's case with Dr. Sosa his treating oncologist.  Currently he is off of chemotherapy.  Given his ongoing symptoms of pain and possible ongoing cholecystitis there is concern about continuing any further chemotherapy due to possible infectious complications.  He does appear to be fit to undergo cholecystectomy.  He would like to proceed with surgery.  The risks and benefits of laparoscopic cholecystectomy, cholangiogram, possible open operation were discussed with the patient and his wife and their questions were answered.  He is scheduled for surgery on 4/21/2021.          This document has been electronically signed by Jose Ely MD on April 13, 2021 14:28 CDT

## 2021-04-13 NOTE — PROGRESS NOTES
"  Subjective     Leopoldo A Abhinav seen in follow-up for small cell lung cancer.  Is s/p 2 cycles of carboplatin, etoposide and Tecentriq.  He was recently in emergency room and was diagnosed with acute cholecystitis.  Patient was not admitted to hospital and was discharged home with recommendation to follow-up with me as outpatient.  He is overall pain has gotten better however he continued to experience intermittent right upper quadrant abdominal pain with low-grade fever.  Discussed with patient about need for surgery before I can give him any further chemotherapy which could potentially cause further worsening of his cholecystitis.    Past Medical History, Past Surgical History, Social History, Family History have been reviewed and are without significant changes except as mentioned.        Medications:  The current medication list was reviewed in the EMR    ALLERGIES:    Allergies   Allergen Reactions   • Contrast Dye Hives   • Keflex [Cephalexin] Hives       Objective      Vitals:    04/13/21 1015   BP: 126/61   Pulse: 92   Resp: 18   Temp: 98.4 °F (36.9 °C)   TempSrc: Temporal   Weight: 63.1 kg (139 lb 1.6 oz)   Height: 177.8 cm (70\")   PainSc:   5   PainLoc: Generalized     Current Status 4/13/2021   ECOG score 1       Physical Exam  Vitals and nursing note reviewed.   Constitutional:       General: He is not in acute distress.     Appearance: Normal appearance.   HENT:      Mouth/Throat:      Mouth: Mucous membranes are moist.      Pharynx: No oropharyngeal exudate.   Cardiovascular:      Rate and Rhythm: Regular rhythm.   Pulmonary:      Effort: Pulmonary effort is normal.      Breath sounds: Normal breath sounds.   Abdominal:      General: There is no distension.      Palpations: Abdomen is soft. There is no mass.   Neurological:      General: No focal deficit present.      Mental Status: He is oriented to person, place, and time. Mental status is at baseline.   Psychiatric:         Mood and Affect: Mood " normal.         Behavior: Behavior normal.               RECENT LABS:Independently reviewed and summarized  Hematology WBC   Date Value Ref Range Status   04/09/2021 10.72 3.40 - 10.80 10*3/mm3 Final   12/10/2018 7.7 4.0 - 11.0 10*3/uL Final     RBC   Date Value Ref Range Status   04/09/2021 3.66 (L) 4.14 - 5.80 10*6/mm3 Final   12/10/2018 4.81 4.73 - 5.49 10*6/uL Final     Hemoglobin   Date Value Ref Range Status   04/09/2021 11.2 (L) 13.0 - 17.7 g/dL Final   12/10/2018 15.2 14.4 - 16.6 g/dL Final     Hematocrit   Date Value Ref Range Status   04/09/2021 32.3 (L) 37.5 - 51.0 % Final   12/10/2018 46.2 42.9 - 49.1 % Final     Platelets   Date Value Ref Range Status   04/09/2021 112 (L) 140 - 450 10*3/mm3 Final   12/10/2018 171 150 - 450 10*3/uL Final            Lab Results   Component Value Date    GLUCOSE 109 (H) 04/09/2021    BUN 9 04/09/2021    CREATININE 0.58 (L) 04/09/2021    EGFRIFNONA 141 04/09/2021    BCR 15.5 04/09/2021    K 3.4 (L) 04/09/2021    CO2 29.0 04/09/2021    CALCIUM 9.0 04/09/2021    ALBUMIN 3.00 (L) 04/09/2021    AST 19 04/09/2021    ALT 25 04/09/2021         Diagnosis:   (1) Small cell lung cancer  (2) Cancer associated pain   (3) Acute cholecystitis     Assessment/Plan       Patient with small cell lung cancer currently on palliative chemotherapy s/p 2 cycles.  At the time of diagnosis he was also diagnosed with obstructive jaundice and had ERCP done at [facility] which showed no malignancy however showed numerous bile duct stones with possible papillary stenosis.  This was treated with stent placement.  Patient received cycle 2 carboplatin, etoposide and Tecentriq 3 weeks ago.  He was recently in our emergency room with acute cholecystitis.  He was discharged home with follow-up with me.    I discussed with patient that he is 3 weeks after from chemotherapy.  I will check his CBC and CMP today to make sure he is not neutropenic.  Patient needs surgical evaluation for possible surgery for  cholecystitis.  Discussed with patient that I would not give him any further chemo without taking care of his cholecystitis as neutropenia from chemotherapy will likely cause further worsening of his cholecystitis or even might cause abscess.    I will discuss with general surgery -Dr. Ely.        Recommendations:   · Referral to surgery for cholecystectomy.  · Check CBC and CMP today.  · His overall pain has been stable on fentanyl and oxycodone.  He is requesting new refill which was sent to the pharmacy.      4/13/2021      CC:

## 2021-04-19 NOTE — PROGRESS NOTES
Oncology SW met face to face with patient and his wife subsequent to his visit with Dr. Mckeon.  SW advised by MD of discussion today related to cancer progression and recommendation for home hospice support.   SW met offering emotional support as both processed feedback and transitions.      LCSW offered education as to role and benefit of hospice as well as options. Both have many questions, specifically in relation to management of pt pain, symptoms and imminent care needs.  Pt. States he would like to stay with [facility] services and agreed to referral to Hospice. Wife requested call next day to discuss, she states concern related to caring for patient as disease need progresses and is interested in speaking further about in patient hospice care provisions.  In keeping with pt direction, SW contacted [facility] hospice and completed referral with above indications of questions and needs. Plans made to call wife in am to discuss further. SW requested call back from hospice in the event admission does not proceed.    Pt/ spouse supported from a person centered approach and ongoing support reinforced.

## 2021-04-19 NOTE — PROGRESS NOTES
Subjective     Patient with small cell lung cancer s/p 2 cycles of carboplatin, etoposide and Tecentriq.  He was also recently diagnosed with acute cholecystitis.  He scheduled to undergo surgery next week however we obtain PET scan to evaluate response to treatment which unfortunately showing widespread metastatic disease.  His overall clinical condition has deteriorated rapidly.  His blood pressure is low.  He is having intermittent fever and from cholecystitis.  Patient who does not respond to first-line systemic chemotherapy for small cell lung cancer has very grim outcome.  I personally do not believe he will be able to handle gallbladder surgery.  Even if he survives gallbladder surgery his overall life expectancy from small cell lung cancer is very limited couple of months at best.  I had a very lengthy discussion with patient and his wife about certainly this type situation.  They were in agreement in proceeding with hospice for symptom management.  He does request that in the past he used to be methadone for his back pain which is control his pain better.  He is currently on fentanyl which is not helping to control his pain.  I will let hospice know.  I will also start him on empiric ciprofloxacin for acute cholecystitis which he will continue.    I try to reach out to Dr. Ely to let him know about canceling his surgery which is scheduled in couple of days.  However he is on vacation.  I discussed with my nurse navigator Jailene to reach out to their offices and cancel the surgery.    Past Medical History, Past Surgical History, Social History, Family History have been reviewed and are without significant changes except as mentioned.        Medications:  The current medication list was reviewed in the EMR    ALLERGIES:    Allergies   Allergen Reactions   • Contrast Dye Hives   • Keflex [Cephalexin] Hives       Objective      Vitals:    04/19/21 1358   BP: (!) 87/60   Pulse: 108   Resp: 18   Temp: 98.5  "°F (36.9 °C)   TempSrc: Temporal   Weight: 60.5 kg (133 lb 6.4 oz)   Height: 177.8 cm (70\")   PainSc:   8   PainLoc: Generalized     Current Status 4/13/2021   ECOG score 1       Physical Exam  Vitals and nursing note reviewed.   Constitutional:       Appearance: He is ill-appearing.   Cardiovascular:      Rate and Rhythm: Regular rhythm. Tachycardia present.   Abdominal:      Tenderness: There is abdominal tenderness. There is guarding.   Psychiatric:         Mood and Affect: Mood normal.         Behavior: Behavior normal.         Thought Content: Thought content normal.               RECENT LABS:Independently reviewed and summarized  Hematology WBC   Date Value Ref Range Status   04/13/2021 14.06 (H) 3.40 - 10.80 10*3/mm3 Final   12/10/2018 7.7 4.0 - 11.0 10*3/uL Final     RBC   Date Value Ref Range Status   04/13/2021 3.23 (L) 4.14 - 5.80 10*6/mm3 Final   12/10/2018 4.81 4.73 - 5.49 10*6/uL Final     Hemoglobin   Date Value Ref Range Status   04/13/2021 9.8 (L) 13.0 - 17.7 g/dL Final   12/10/2018 15.2 14.4 - 16.6 g/dL Final     Hematocrit   Date Value Ref Range Status   04/13/2021 28.5 (L) 37.5 - 51.0 % Final   12/10/2018 46.2 42.9 - 49.1 % Final     Platelets   Date Value Ref Range Status   04/13/2021 174 140 - 450 10*3/mm3 Final   12/10/2018 171 150 - 450 10*3/uL Final            Lab Results   Component Value Date    GLUCOSE 134 (H) 04/13/2021    BUN 19 04/13/2021    CREATININE 0.64 (L) 04/13/2021    EGFRIFNONA 126 04/13/2021    BCR 29.7 (H) 04/13/2021    K 3.3 (L) 04/13/2021    CO2 32.0 (H) 04/13/2021    CALCIUM 8.5 (L) 04/13/2021    ALBUMIN 2.70 (L) 04/13/2021    AST 23 04/13/2021    ALT 23 04/13/2021       Imaging (independently reviewed and summarized):   Results of PET scan from April 16, 2021 reviewed.  This showed marked progression of metastatic disease including liver, lung as well as extensive osseous metastasis.    Leopoldo La reports a pain score of 8.  Given his pain assessment as noted, " treatment options were discussed and the following options were decided upon as a follow-up plan to address the patient's pain: prescription for opiod analgesics.    Patient screened positive for depression based on a PHQ-9 score of 0 on 4/19/2021. Follow-up recommendations include: Suicide Risk Assessment performed.      Diagnosis:   (1) Small cell lung cancer  (2) Acute cholecystitis         Assessment/Plan     Patient with small cell lung cancer s/p 2 cycles of carboplatin, etoposide and Tecentriq.  He was also recently diagnosed with acute cholecystitis.  He scheduled to undergo surgery next week however we obtain PET scan to evaluate response to treatment which unfortunately showing widespread metastatic disease.  His overall clinical condition has deteriorated rapidly.  His blood pressure is low.  He is having intermittent fever and from cholecystitis.  Patient who does not respond to first-line systemic chemotherapy for small cell lung cancer has very grim outcome.  I personally do not believe he will be able to handle gallbladder surgery.  Even if he survives gallbladder surgery his overall life expectancy from small cell lung cancer is very limited couple of months at best.  I had a very lengthy discussion with patient and his wife about certainly this type situation.  They were in agreement in proceeding with hospice for symptom management.  He does request that in the past he used to be methadone for his back pain which is control his pain better.  He is currently on fentanyl which is not helping to control his pain.  I will let hospice know.  I will also start him on empiric ciprofloxacin for acute cholecystitis which he will continue.    I try to reach out to Dr. Ely to let him know about canceling his surgery which is scheduled in couple of days.  However he is on vacation.  I discussed with my nurse navigator Jailene to reach out to their offices and cancel the surgery.      Recommendations:    · De-escalate care due to overall poor prognosis.  · Recommend enrollment in hospice.  · We will cancel cholecystitis surgery given his overall outcome from aggressive small cell lung cancer is poor.  I will give him ciprofloxacin for acute cholecystitis which he will continue.  · Patient is requesting methadone for pain control.  I will let hospice know.    Overall prognosis is grim.  Expect life expectancy in days to week at best.     Very unfortunate situation for this very pleasant man and his wife.  I wish him the best.      4/19/2021      CC:

## 2021-04-19 NOTE — PROGRESS NOTES
ONCOLOGY PATIENT NAVIGATION    DIAGNOSIS: Lung Cancer  ENCOUNTER TYPE: In-person    Spoke to pt’s wife by phone this am requesting they return to clinic to see Dr. Sosa today.  Dr. Sosa reviewed PET results with Mr. La and wife which showed progression of disease. Pt and wife were in agreement with recommended Hospice care. Hospice referral was placed by Ellie Zhang LCSW who also provided essential emotional support.     Spoke with Michelle in Dr. Ely’s absence to request cancellation of scheduled gallbladder surgery on 4/21/21. Explained that patient was transitioning to Hospice care and encouraged Dr. lEy to call with any questions.  Offer of ongoing support reinforced to patient and wife.

## 2021-04-24 PROBLEM — K92.2 UGIB (UPPER GASTROINTESTINAL BLEED): Status: ACTIVE | Noted: 2021-01-01

## 2021-04-24 PROBLEM — Z51.5 END OF LIFE CARE: Status: ACTIVE | Noted: 2021-01-01

## 2021-04-24 NOTE — ED PROVIDER NOTES
Subjective   66yo male pmh significant terminal small cell lung cancer with current hospice enrollment/DNR status, with recent diagnosis cholecystitis treated medically secondary to poor surgical candidate presents ED via EMS with c/o acute onset large volume black hematemesis/respiratory distress.  Pt arrives minimally responsive in respiratory distress with large volume coffee ground emesis.  No further hx available.  DNR status confirmed with EMS DNR form/patient wife.      History provided by:  EMS personnel  History limited by:  Patient unresponsive  Illness      Review of Systems   Unable to perform ROS: Acuity of condition       Past Medical History:   Diagnosis Date   • Bone cancer (CMS/HCC)     mets   • COPD (chronic obstructive pulmonary disease) (CMS/HCC)    • GERD (gastroesophageal reflux disease)    • Lung cancer (CMS/HCC)     with mets to lymph nodes   • Lung mass        Allergies   Allergen Reactions   • Contrast Dye Hives   • Keflex [Cephalexin] Hives       Past Surgical History:   Procedure Laterality Date   • BACK SURGERY      x 3   • HERNIA REPAIR     • LUMBAR FUSION         Family History   Problem Relation Age of Onset   • Hypertension Father        Social History     Socioeconomic History   • Marital status:      Spouse name: Not on file   • Number of children: Not on file   • Years of education: Not on file   • Highest education level: Not on file   Tobacco Use   • Smoking status: Former Smoker     Packs/day: 0.25     Years: 20.00     Pack years: 5.00     Types: Cigarettes     Quit date: 2019     Years since quittin.3   • Smokeless tobacco: Never Used   Substance and Sexual Activity   • Alcohol use: Never   • Drug use: Never   • Sexual activity: Defer           Objective   Physical Exam  Vitals and nursing note reviewed.   Constitutional:       Appearance: He is cachectic. He is ill-appearing.   HENT:      Mouth/Throat:      Mouth: Mucous membranes are moist.      Comments:  Coffee ground emesis per oropharynx  Eyes:      Comments: Pupils 4mm NR bilateral   Cardiovascular:      Rate and Rhythm: Regular rhythm. Tachycardia present.      Pulses: Normal pulses.      Heart sounds: Normal heart sounds. No murmur heard.   No friction rub. No gallop.    Pulmonary:      Effort: Tachypnea and accessory muscle usage present.      Breath sounds: Examination of the right-lower field reveals decreased breath sounds. Examination of the left-lower field reveals decreased breath sounds. Decreased breath sounds present. No wheezing, rhonchi or rales.   Abdominal:      General: Bowel sounds are absent. There is distension.      Tenderness: There is generalized abdominal tenderness. There is guarding. There is no rebound.       Musculoskeletal:         General: No swelling or deformity.      Cervical back: Normal range of motion and neck supple. No rigidity.   Lymphadenopathy:      Cervical: No cervical adenopathy.   Skin:     Coloration: Skin is pale.   Neurological:      Mental Status: He is lethargic.      GCS: GCS eye subscore is 4. GCS verbal subscore is 2. GCS motor subscore is 4.         ECG 12 Lead      Date/Time: 4/24/2021 6:30 PM  Performed by: Damon Syed MD  Authorized by: Damon Syed MD   Interpreted by physician  Rhythm: sinus tachycardia  Rate: tachycardic  BPM: 140  QRS axis: normal  Conduction: conduction normal  ST Segments: ST segments normal  Other findings: PRWP  Clinical impression: non-specific ECG                 ED Course      Labs Reviewed   BLOOD GAS, ARTERIAL - Abnormal; Notable for the following components:       Result Value    pH, Arterial 7.098 (*)     pCO2, Arterial 65.7 (*)     pO2, Arterial 66.4 (*)     Base Excess, Arterial -10.2 (*)     O2 Saturation, Arterial 80.7 (*)     All other components within normal limits   BLOOD GAS, ARTERIAL   COMPREHENSIVE METABOLIC PANEL   LIPASE   URINALYSIS W/ MICROSCOPIC IF INDICATED (NO CULTURE)   AMYLASE   PROTIME-INR   APTT      TROPONIN (IN-HOUSE)   TROPONIN (IN-HOUSE)   LACTIC ACID, PLASMA   CBC WITH AUTO DIFFERENTIAL   TYPE AND SCREEN   PREPARE RBC   CBC AND DIFFERENTIAL    Narrative:     The following orders were created for panel order CBC & Differential.  Procedure                               Abnormality         Status                     ---------                               -----------         ------                     CBC Auto Differential[[id number]]                                                         Please view results for these tests on the individual orders.     CT Abdomen Pelvis Without Contrast    Result Date: 4/10/2021  Narrative: EXAM:   CT Abdomen and Pelvis Without Intravenous Contrast CLINICAL HISTORY:   The patient is 65 years old and is Male; abd pain, TECHNIQUE:   Axial computed tomography images of the abdomen and pelvis without intravenous contrast.  Sagittal and coronal reformatted images were created and reviewed.  This CT exam was performed using one or more of the following dose reduction techniques: automated exposure control, adjustment of the mA and/or kV according to patient size, and/or use of iterative reconstruction technique. COMPARISON:   No relevant prior studies available. FINDINGS:   LUNG BASES:  Fibrotic change in the lung bases is noted.  ABDOMEN:   LIVER:  Homogeneous without focal mass.   GALLBLADDER AND BILE DUCTS:  Diffuse gallbladder wall thickening and pericholecystic fluid is present. Multiple small gallstones are noted layering within the gallbladder.  The common bile duct stent is present. The common bile duct is dilated. Dilatation of the intrahepatic ducts is present. Moderate amount of pneumobilia is noted.   PANCREAS:  The pancreas is atrophic.  No ductal dilation.   SPLEEN:  The spleen is enlarged for several splenic granuloma.   ADRENALS:  Unremarkable.  No mass.   KIDNEYS AND URETERS:  No obstructing stones.  No hydronephrosis. No perinephric fluid.   STOMACH AND BOWEL:   The stomach is minimally distended. The small bowel is normal in caliber. A moderate amount stool is present throughout colon. There is no mucosal thickening or evidence of bowel obstruction.  PELVIS:   APPENDIX:  The appendix is surgically absent.   BLADDER:  Unremarkable.  No stones.   REPRODUCTIVE:  Unremarkable as visualized.  ABDOMEN and PELVIS:   INTRAPERITONEAL SPACE:  Small amount of fluid and inflammation within the right upper quadrant is noted.  No free air.   BONES/JOINTS:  Degenerative change of the lower lumbar spine is present. No lytic or blastic lesions are seen.   SOFT TISSUES:  The soft tissues are normal.   VASCULATURE:  Atherosclerosis of the vasculature is present. The vessels are normal in caliber.  No abdominal aortic aneurysm.   LYMPH NODES:  Unremarkable. No enlarged lymph nodes.     Impression: 1.  Findings suggest acute cholecystitis. Significant inflammation of the gallbladder and within the right upper quadrant is noted. 2.  Moderate pneumobilia with associated biliary dilatation. A common bile duct stent is in place. Electronically signed by:  Negrita Frank MD  4/10/2021 12:08 AM CDT Workstation: [id number]    CT Head Without Contrast    Result Date: 4/9/2021  Narrative: EXAM:   CT Head Without Intravenous Contrast CLINICAL HISTORY:   The patient is 65 years old and is Male; confusion, lung ca TECHNIQUE:   Axial computed tomography images of the head/brain without intravenous contrast.  Sagittal and coronal reformatted images were created and reviewed.  This CT exam was performed using one or more of the following dose reduction techniques:  automated exposure control, adjustment of the mA and/or kV according to patient size, and/or use of iterative reconstruction technique. COMPARISON:   No relevant prior studies available. FINDINGS:   BRAIN:  Unremarkable.  The gray-white matter differentiation is preserved . No hemorrhage.  No significant white matter disease. No edema. No  extra-axial fluid collections.   VENTRICLES:  Unremarkable.  No ventriculomegaly.   BONES/JOINTS:  No acute fracture.   SOFT TISSUES:  Unremarkable.   SINUSES:  Unremarkable as visualized.  No acute sinusitis.   MASTOID AIR CELLS:  Unremarkable as visualized.  No mastoid effusion.   ORBITS:  Unremarkable as visualized.     Impression:   No acute intracranial findings. Electronically signed by:  Negrita Frank MD  4/9/2021 11:46 PM CDT Workstation: [id number]    MRI Brain With & Without Contrast    Result Date: 4/16/2021  Narrative: MRI of the brain without and with contrast HISTORY: Staging for small cell carcinoma of the lung. Multisequence multiplanar images of the brain were obtained without and with contrast. COMPARISON: None. Correlation CT April 9, 2021. FINDINGS: The paranasal sinuses are unremarkable. Some of the sequences are limited by artifact. Diffusion images do not demonstrate an acute infarct. No hemorrhage. No mass. No abnormal areas of increased or decreased signal. No abnormal enhancement. No midline shift and no abnormal extra-axial fluid collections. Normal signal flow voids are present in the major vessels and venous sinuses.     Impression: CONCLUSION: No metastatic disease demonstrated. [id number] Electronically signed by:  Jadon Dahl MD  4/16/2021 5:32 PM CDT Workstation: [id number]    XR Chest 1 View    Result Date: 4/9/2021  Narrative: EXAM:   XR Chest, 1 View CLINICAL HISTORY:   The patient is 65 years old and is Male; Chest pain protocol chest pain protocol TECHNIQUE:   Frontal view of the chest. COMPARISON: Chest radiograph March 1, 2021. FINDINGS:   LUNGS:  Left upper lobe and left hilar masses are redemonstrated and grossly stable.  The lungs are hyperinflated with coarsened interstitial markings.   PLEURAL SPACE:  Unremarkable.  No pneumothorax.   HEART:  Unremarkable.  No cardiomegaly.   MEDIASTINUM:  Calcified left hilar lymph nodes are present.   BONES/JOINTS:  There are degenerative  changes of the spine.   TUBES, LINES AND DEVICES:  A right upper extremity PICC is present with the tip in the SVC.     Impression:   No change in the left upper lobe and left hilar masses. Electronically signed by:  Negrita Frank MD  4/9/2021 11:44 PM CDT Workstation: [id number]    NM Pet Skull Base To Mid Thigh    Result Date: 4/18/2021  Narrative: EXAM:  Nuclear Medicine Body PET/CT COMPARISON EXAMINATIONS: CT abdomen and pelvis dated April 9, 2021. Prior PET/CT dated 1/29/2020 HISTORY: small cell lung cancer, response to treatment, C34.90 Malignant neoplasm of unspecified part of unspecified bronchus or lung C41.9 Malignant neoplasm of bone and articular cartilage, unspecified Dose:  12.60 mCi of F-18 FDG, I.V. Glucose:  179 mg / dl (capillary blood glucose measured at the time of injection) IV contrast: None After allowing for an appropriate amount of time for uptake of the radiotracer, tomographic image acquisition was performed with PET and CT, from the base of the brain to the mid thighs. Anatomic imaging was performed during normal tidal respiration which potentially creates edge distortion due to motion artifact(s). The CT examination was performed without intravenous / oral contrast for the purpose of attenuation correction and is considered limited and 'non-diagnostic'  which precludes evaluation of solid organ parenchyma, characterization of solid masses, or depiction of subtle vascular pathology.  These non-diagnostic, non-contrast enhanced CT images obtained during tidal respiration, provide only a limited assessment of solid and hollow viscera, and do not replace diagnostic quality contrast enhanced CT scans. The PET and CT images were reconstructed in the axial, coronal, and sagittal planes and viewed independently as well as in a co-registered fashion.  -------------- FINDINGS: HEAD and NECK: No suspicious hypermetabolic activity THORAX: There is hypermetabolic activity associated with the left  hilar mass with an SUV max of 16.7. The mass measures approximately 5.3 x 6.2 cm. There is hypermetabolic activity associated with the more peripheral left upper lobe mass with SUV max of 10.9, this measures approximately 8.3 cm in greatest dimension. There is left hilar lymphadenopathy associated with the large left hilar mass. There is anterior mediastinal lymphadenopathy with SUV max of 4.5 and subcarinal lymphadenopathy with SUV max 7.1. ABDOMEN/PELVIS: There is a hypermetabolic lesion in the superior most aspect of the right lobe of the liver with SUV max of 8.1, no definite CT correlate is seen. There is a focus of hypermetabolic activity in the inferior right lobe with SUV max of 6.5 adjacent to the gallbladder. More faint activity around the gallbladder is also noted with SUV max of 4.1.  The gallbladder appears edematous and the wall of the gallbladder is mildly metabolic with SUV max of 2.6. There are stones in the gallbladder. Findings are consistent with cholecystitis. There is pneumobilia. There appears to be a duodenal stent present. OSSEOUS / MISC: There are extensive osseous metastases seen only on the PET data set, no definite CT lesions are seen. These are noted in the cervical spine, SUV max 3.8 in the left T2 lamina region, in the right posterolateral third rib with SUV max of 3.0, and in the right lateral third rib with SUV max of 3.6, the left lateral fifth rib with SUV max of 3.2, the sternal body with SUV max of 5.1, the left T6 lamina with SUV max of 3.3, the T7 vertebral body with SUV max of 5.8, the T8 vertebral body and bilateral posterior elements with SUV max 6.3, the T11 vertebral body with SUV max of 4.3 the L4 right lamina with SUV max of 3.3, the left sacral alum with SUV max of 13.2, the bilateral iliac bones with SUV max of 10.3 on the right and 8.9 on the left, the right acetabulum with SUV max of 4.7, the right proximal femur with SUV max of 16.6. Many of these lesions are new  and/or enlarging.     Impression: CONCLUSION: 1.  Findings consistent with a progression of disease. 2.  Hypermetabolic activity associated with the left hilar/perihilar mass with metastatic lesions in the left lung. 3.  New metastatic lesions in the liver 4.  Extensive osseous metastases, including many which are new and/or enlarging. 5.  Acute calculus cholecystitis. 6.  Pneumobilia and a duodenal stent. Findings discussed with TERI BRONSON on 4/18/2021 9:41 AM CDT Electronically signed by:  Daniel Gotti MD  4/18/2021 9:41 AM CDT Workstation: [id number]                                         [facility]    Final diagnoses:   UGIB (upper gastrointestinal bleed)   Malignant neoplasm of lung, unspecified laterality, unspecified part of lung (CMS/HCC)   Hemorrhagic shock (CMS/HCC)   Acute respiratory failure with hypoxia and hypercapnia (CMS/HCC)       ED Disposition  ED Disposition     ED Disposition Condition Comment    Decision to Admit  Level of Care: Telemetry [5]   Admitting Physician: RUBEN CARUSO [[id number]]   Attending Physician: RUBEN CARUSO [[id number]]   Patient Class: Inpatient [101]            No follow-up provider specified.       Medication List      No changes were made to your prescriptions during this visit.          Damon Syed MD  04/24/21 [id number]

## 2021-04-24 NOTE — H&P
[facility] Medicine Admission      Date of Admission: 4/24/2021      Primary Care Physician: Conchis Barkley APRN      Chief Complaint: Hematemesis, shortness of breath    HPI: 65-year-old  male with past medical history of widespread metastatic small cell lung cancer, cholecystitis, hypertension, gastroesophageal reflux disease who presented on 4/24/2021 with hematemesis. Patient was recently seen by his oncologist on 4/19/2021 and had underwent PET scan imaging which revealed widespread metastatic disease. Patient has had recent decline in his overall condition and had opted for hospice care at home. History is obtained from patient's wife and daughter as patient is unresponsive. Daughter reports that patient had been comfortable at home and essentially unresponsive since yesterday but began vomiting blood and having extreme shortness of breath today. She reports the symptoms were unable to be managed at home and he was brought into the emergency department for further evaluation.  Patient was evaluated in ED and started on blood transfusion, Protonix drip, and NG tube placed.  His wife and daughter are in agreement that they do not want any aggressive measures and wish for comfort measures only.  Hospitalist team is asked to admit for end of life care.  At time of exam, patient is rolling side to side on stretcher and exhibits labored breathing with rapid rate.      Concurrent Medical History:  has a past medical history of Bone cancer (CMS/HCC), COPD (chronic obstructive pulmonary disease) (CMS/HCC), GERD (gastroesophageal reflux disease), Lung cancer (CMS/HCC), and Lung mass.    Past Surgical History:  has a past surgical history that includes Lumbar fusion; Hernia repair; and Back surgery.    Family History: family history includes Hypertension in his father.    Social History:  reports that he quit smoking about 2 years ago. His smoking use included  cigarettes. He has a 5.00 pack-year smoking history. He has never used smokeless tobacco. He reports that he does not drink alcohol and does not use drugs.    Allergies:   Allergies   Allergen Reactions   • Contrast Dye Hives   • Keflex [Cephalexin] Hives       Medications:   Prior to Admission medications    Medication Sig Start Date End Date Taking? Authorizing Provider   albuterol sulfate  (90 Base) MCG/ACT inhaler Inhale 1 puff Every 6 (Six) Hours As Needed. 1/21/21   Glenda Rothman MD   carisoprodol (Soma) 350 MG tablet Take 1 tablet by mouth 3 (Three) Times a Day. 4/21/21   Erwin Russell MD   ciprofloxacin (CIPRO) 500 MG tablet Take 1 tablet by mouth 2 (Two) Times a Day. 4/19/21   Arianne Mckeon MD   diphenoxylate-atropine (LOMOTIL) 2.5-0.025 MG per tablet Take 1 tablet by mouth 4 (Four) Times a Day As Needed for Diarrhea. 3/25/21   Arianne Mckeon MD   fentaNYL (DURAGESIC) 75 MCG/HR patch Place 1 patch on the skin as directed by provider Every 72 (Seventy-Two) Hours. 4/20/21   Erwin Russell MD   methadone (Dolophine) 5 MG tablet Take 1 tablet by mouth 2 (two) times a day. [facility] 4/21/21   Erwin Russell MD   methocarbamol (ROBAXIN) 750 MG tablet Take 1 tablet by mouth 3 (Three) Times a Day. 4/1/21   Arianne Mckeon MD   ondansetron (ZOFRAN) 8 MG tablet Take 1 tablet by mouth 3 (Three) Times a Day As Needed for Nausea or Vomiting. 3/1/21   Arianne Mckeon MD   OXYCODONE 40MG/ML SOL Take 0.38 mL by mouth Every 3 (Three) Hours As Needed for Pain. [facility] 4/23/21   Erwin Russell MD   pregabalin (LYRICA) 100 MG capsule 100 mg 2 (Two) Times a Day. 1/18/21   Glenda Rothman MD   prochlorperazine (COMPAZINE) 5 MG tablet Take 1 tablet by mouth Every 6 (Six) Hours As Needed for Nausea or Vomiting. 4/10/21   Sarah Tenorio MD   tamsulosin (FLOMAX) 0.4 MG capsule 24 hr capsule Take 1 capsule by mouth Daily.     Provider, MD Glenda       Review of Systems:  Review of Systems   Unable to perform ROS: Patient unresponsive            Physical Exam:   Heart Rate:  [] 141  Resp:  [16-52] 48  BP: ()/(38-57) 103/57  Physical Exam  Vitals and nursing note reviewed.   Constitutional:       General: He is in acute distress.      Appearance: He is cachectic. He is ill-appearing.   HENT:      Head: Normocephalic and atraumatic.      Right Ear: External ear normal.      Left Ear: External ear normal.      Nose:      Comments: Dark bleeding noted in bilateral nares.  NG in place with approximately 900 cc coffee ground emesis noted in canister     Mouth/Throat:      Mouth: Mucous membranes are dry.   Eyes:      General: No scleral icterus.        Right eye: No discharge.         Left eye: No discharge.      Comments: Eyes partially opened.  Pupils fixed and nonreactive   Cardiovascular:      Rate and Rhythm: Tachycardia present. Rhythm irregular.      Pulses: Decreased pulses.      Heart sounds: No murmur heard.   No friction rub. No gallop.    Pulmonary:      Effort: Tachypnea, accessory muscle usage and respiratory distress present.      Breath sounds: Decreased air movement present. Examination of the right-upper field reveals decreased breath sounds and rales. Examination of the left-upper field reveals decreased breath sounds and rales. Examination of the right-lower field reveals decreased breath sounds and rales. Examination of the left-lower field reveals decreased breath sounds and rales. Decreased breath sounds and rales present.   Abdominal:      General: Bowel sounds are decreased. There is distension.      Tenderness: There is no abdominal tenderness.   Musculoskeletal:      Cervical back: Neck supple.      Right lower leg: No edema.      Left lower leg: No edema.   Skin:     Coloration: Skin is pale.   Neurological:      Mental Status: He is unresponsive.   Psychiatric:         Speech: He is  noncommunicative.           Results Reviewed:  I have personally reviewed current lab, radiology, and data and agree with results.  Lab Results (last 24 hours)     Procedure Component Value Units Date/Time    COVID-19 and FLU A/B PCR - Swab, Nasopharynx [[id number]] Collected: 04/24/21 1855    Specimen: Swab from Nasopharynx Updated: 04/24/21 1856    POC Glucose Once [[id number]]  (Normal) Collected: 04/24/21 1750    Specimen: Blood Updated: 04/24/21 1836     Glucose 71 mg/dL      Comment: RN NotifiedOperator: [id number] BANDAR LINDMeter ID: [id number]       Blood Gas, Arterial - [[id number]]  (Abnormal) Collected: 04/24/21 1806    Specimen: Arterial Blood Updated: 04/24/21 1810     Site Left Brachial     Adam's Test N/A     pH, Arterial 7.098 pH units      Comment: 85 Value below critical limit        pCO2, Arterial 65.7 mm Hg      Comment: 83 Value above reference range        pO2, Arterial 66.4 mm Hg      Comment: 84 Value below reference range        HCO3, Arterial 20.3 mmol/L      Base Excess, Arterial -10.2 mmol/L      Comment: 84 Value below reference range        O2 Saturation, Arterial 80.7 %      Comment: 84 Value below reference range        Barometric Pressure for Blood Gas 741 mmHg      Modality NRB     Ventilator Mode NA     Collected by NC     Comment: Meter: [id number]     :  [id number]           Imaging Results (Last 24 Hours)     Procedure Component Value Units Date/Time    XR Chest 1 View [[id number]] Collected: 04/24/21 1809     Updated: 04/24/21 1832    Narrative:      PROCEDURE: XR CHEST 1 VW    VIEWS:Single    INDICATION: Respiratory distress    COMPARISON: CXR: 4/9/2021    FINDINGS:       - lines/tubes: Nasogastric tube terminates below the left  hemidiaphragm, incompletely included in this study but present at  least as far distally as the stomach    - cardiac: Size within normal limits.    - mediastinum: Enlargement of the left hilum, as seen  previously.     - lungs: Slightly  increased prominence of left upper lung zone  mass.     - pleura: No evidence of  fluid.      - osseous: Unremarkable for age.      Impression:      1. Nasogastric tube present at least as far distally as the  stomach  2. Increasing prominence of right upper lung zone Mass.  3. Stable prominence of left hilum      Electronically signed by:  Daphne Vazquez MD  4/24/2021 6:31 PM CDT  Workstation: [id number]            Assessment:    Active Hospital Problems    Diagnosis    • End of life care    • UGIB (upper gastrointestinal bleed)    • Small cell lung cancer (CMS/HCC)    • Cancer associated pain              Plan:  1. End of life care: Morphine 2 mg every one hour PRN for pain/air hunger, Ativan 1 mg every four hours prn for agitation.  Scopolamine and atropine sublingual for management of secretions.  Zofran as needed for nausea/vomiting.     Further orders will depend upon hospital course.  Plan of care discussed with patient's wife and daughter at bedside.  Code status confirmed with family and wishes are for comfort measures only status.  Patient is actively dying with expecting passing within the hour when aggressive measures are discontinued.          This document has been electronically signed by KALI Gonzalez on April 24, 2021 18:57 CDT

## 2021-04-25 NOTE — SIGNIFICANT NOTE
Son and friend at bedside when patient . Wife unsure of  home at this time and will contact tomorrow as to arrangements. Gave son number to call when decision is made. House sup advised to take patient to morgue and give paperwork to lab

## 2021-04-25 NOTE — ACP (ADVANCE CARE PLANNING)
Advanced care planning discussion held with patient's wife and daughter regarding previous diagnosis, poor prognosis, and wishes going forward.  Patient's wife, Lily, is his next of kin and decision maker she reports and this is confirmed by his daughter at bedside.  Lily notes that wishes are for DNR/DNI code status with comfort measures only as patient was previously under hospice care at home.  Patient's home pain medications were discussed with wife and at home he was using Fentanyl patch and oral oxycodone liquid.  Will plan to continue Fentanyl patch and use Morphine IV for pain/air hunger, Ativan for anxiety/agitation, and scopolamine/atropine for management of secretions.

## 2021-04-26 LAB
BH BB BLOOD EXPIRATION DATE: NORMAL
BH BB BLOOD EXPIRATION DATE: NORMAL
BH BB BLOOD TYPE BARCODE: NORMAL
BH BB BLOOD TYPE BARCODE: NORMAL
BH BB DISPENSE STATUS: NORMAL
BH BB DISPENSE STATUS: NORMAL
BH BB PRODUCT CODE: NORMAL
BH BB PRODUCT CODE: NORMAL
BH BB UNIT NUMBER: NORMAL
BH BB UNIT NUMBER: NORMAL
CROSSMATCH INTERPRETATION: NORMAL
CROSSMATCH INTERPRETATION: NORMAL
UNIT  ABO: NORMAL
UNIT  ABO: NORMAL
UNIT  RH: NORMAL
UNIT  RH: NORMAL

## 2021-04-27 ENCOUNTER — APPOINTMENT (OUTPATIENT)
Dept: ONCOLOGY | Facility: CLINIC | Age: 66
End: 2021-04-27

## 2021-04-27 ENCOUNTER — APPOINTMENT (OUTPATIENT)
Dept: ONCOLOGY | Facility: HOSPITAL | Age: 66
End: 2021-04-27

## 2021-05-05 LAB
QT INTERVAL: 300 MS
QTC INTERVAL: 458 MS

## 2023-09-15 NOTE — DISCHARGE SUMMARY
[facility]  DEATH SUMMARY       Date of Admission: 4/24/2021  Date of Death:  4/25/2021 at approximately 0001  Primary Care Physician: Conchis Barkley APRN    Presenting Problem/History of Present Illness:  Hemorrhagic shock (CMS/HCC) [R57.8]  Lung mass [R91.8]  UGIB (upper gastrointestinal bleed) [K92.2]  Acute respiratory failure with hypoxia and hypercapnia (CMS/HCC) [J96.01, J96.02]  Malignant neoplasm of lung, unspecified laterality, unspecified part of lung (CMS/HCC) [C34.90]     Final Death Diagnoses:  Active Hospital Problems    Diagnosis    • End of life care    • UGIB (upper gastrointestinal bleed)    • Small cell lung cancer (CMS/HCC)    • Cancer associated pain        Consults:   Consults     No orders found for last 30 day(s).          Procedures Performed:                 Pertinent Test Results:     Hospital Course:  65-year-old  male with past medical history of widespread metastatic small cell lung cancer, cholecystitis, hypertension, gastroesophageal reflux disease who presented on 4/24/2021 with hematemesis. Patient was recently seen by his oncologist on 4/19/2021 and had underwent PET scan imaging which revealed widespread metastatic disease. Patient has had recent decline in his overall condition and had opted for hospice care at home. He was brought to ED on 4/24/2021 due to uncontrolled dyspnea and hematemesis making comfort care at home unsuccessful.  He was placed under comfort measures only and provided with Morphine and Ativan as needed for pain/anxiety/air hunger relief.  He passed away with family at bedside on 4/25/2021 at approximately midnight.           This document has been electronically signed by KALI Gonzalez on April 25, 2021 06:40 CDT                   
Strong peripheral pulses